# Patient Record
Sex: FEMALE | Race: WHITE | Employment: OTHER | URBAN - METROPOLITAN AREA
[De-identification: names, ages, dates, MRNs, and addresses within clinical notes are randomized per-mention and may not be internally consistent; named-entity substitution may affect disease eponyms.]

---

## 2017-02-27 ENCOUNTER — HOSPITAL ENCOUNTER (OUTPATIENT)
Dept: ULTRASOUND IMAGING | Age: 72
Discharge: HOME | End: 2017-02-27
Attending: INTERNAL MEDICINE
Payer: MEDICARE

## 2017-02-27 DIAGNOSIS — R93.2 ABNORMAL FINDINGS ON DIAGNOSTIC IMAGING OF LIVER: ICD-10-CM

## 2017-02-27 PROCEDURE — 76705 ECHO EXAM OF ABDOMEN: CPT

## 2017-04-17 ENCOUNTER — HOSPITAL ENCOUNTER (OUTPATIENT)
Dept: NUCLEAR MEDICINE | Age: 72
Discharge: HOME | End: 2017-04-17
Attending: INTERNAL MEDICINE
Payer: MEDICARE

## 2017-04-17 VITALS — WEIGHT: 134 LBS | BODY MASS INDEX: 20.37 KG/M2

## 2017-04-17 DIAGNOSIS — K80.20 CALCULUS OF GALLBLADDER WITHOUT CHOLECYSTITIS WITHOUT OBSTRUCTION: ICD-10-CM

## 2017-04-17 PROCEDURE — 74011250636 HC RX REV CODE- 250/636: Performed by: INTERNAL MEDICINE

## 2017-04-17 PROCEDURE — 78227 HEPATOBIL SYST IMAGE W/DRUG: CPT

## 2017-04-17 PROCEDURE — 74011000258 HC RX REV CODE- 258: Performed by: INTERNAL MEDICINE

## 2017-04-17 RX ORDER — SODIUM CHLORIDE 0.9 % (FLUSH) 0.9 %
10 SYRINGE (ML) INJECTION
Status: COMPLETED | OUTPATIENT
Start: 2017-04-17 | End: 2017-04-17

## 2017-04-17 RX ORDER — SODIUM CHLORIDE 9 MG/ML
25 INJECTION, SOLUTION INTRAVENOUS
Status: COMPLETED | OUTPATIENT
Start: 2017-04-17 | End: 2017-04-17

## 2017-04-17 RX ADMIN — SINCALIDE 1.22 MCG: 5 INJECTION, POWDER, LYOPHILIZED, FOR SOLUTION INTRAVENOUS at 12:34

## 2017-04-17 RX ADMIN — Medication 10 ML: at 12:00

## 2017-04-17 RX ADMIN — SODIUM CHLORIDE 25 ML: 900 INJECTION, SOLUTION INTRAVENOUS at 12:00

## 2017-08-04 ENCOUNTER — HOSPITAL ENCOUNTER (OUTPATIENT)
Dept: ULTRASOUND IMAGING | Age: 72
Discharge: HOME | End: 2017-08-04
Attending: INTERNAL MEDICINE
Payer: MEDICARE

## 2017-08-04 DIAGNOSIS — R10.2 PELVIC AND PERINEAL PAIN: ICD-10-CM

## 2017-08-04 PROCEDURE — 76830 TRANSVAGINAL US NON-OB: CPT

## 2017-08-04 PROCEDURE — 76856 US EXAM PELVIC COMPLETE: CPT

## 2020-07-17 ENCOUNTER — HOSPITAL ENCOUNTER (OUTPATIENT)
Dept: GENERAL RADIOLOGY | Age: 75
Discharge: HOME | End: 2020-07-17
Attending: INTERNAL MEDICINE
Payer: MEDICARE

## 2020-07-17 DIAGNOSIS — M53.3 DISORDER OF SACRUM: ICD-10-CM

## 2020-07-17 PROCEDURE — 72220 X-RAY EXAM SACRUM TAILBONE: CPT

## 2021-12-13 ENCOUNTER — HOSPITAL ENCOUNTER (OUTPATIENT)
Dept: ULTRASOUND IMAGING | Age: 76
Discharge: HOME | End: 2021-12-13
Attending: INTERNAL MEDICINE
Payer: MEDICARE

## 2021-12-13 ENCOUNTER — TRANSCRIBE ORDER (OUTPATIENT)
Dept: SCHEDULING | Age: 76
End: 2021-12-13

## 2021-12-13 DIAGNOSIS — R22.41 LOCALIZED SWELLING, MASS AND LUMP, RIGHT LOWER LIMB: Primary | ICD-10-CM

## 2021-12-13 DIAGNOSIS — R22.41 LOCALIZED SWELLING, MASS AND LUMP, RIGHT LOWER LIMB: ICD-10-CM

## 2021-12-13 PROCEDURE — 93971 EXTREMITY STUDY: CPT

## 2021-12-15 ENCOUNTER — OFFICE VISIT (OUTPATIENT)
Dept: ORTHOPEDIC SURGERY | Age: 76
End: 2021-12-15
Payer: MEDICARE

## 2021-12-15 VITALS — WEIGHT: 132 LBS | BODY MASS INDEX: 21.21 KG/M2 | HEIGHT: 66 IN

## 2021-12-15 DIAGNOSIS — M25.569 KNEE PAIN, UNSPECIFIED CHRONICITY, UNSPECIFIED LATERALITY: Primary | ICD-10-CM

## 2021-12-15 DIAGNOSIS — M17.11 OSTEOARTHRITIS OF RIGHT KNEE, UNSPECIFIED OSTEOARTHRITIS TYPE: ICD-10-CM

## 2021-12-15 PROCEDURE — 1101F PT FALLS ASSESS-DOCD LE1/YR: CPT | Performed by: ORTHOPAEDIC SURGERY

## 2021-12-15 PROCEDURE — G8420 CALC BMI NORM PARAMETERS: HCPCS | Performed by: ORTHOPAEDIC SURGERY

## 2021-12-15 PROCEDURE — G8400 PT W/DXA NO RESULTS DOC: HCPCS | Performed by: ORTHOPAEDIC SURGERY

## 2021-12-15 PROCEDURE — G8432 DEP SCR NOT DOC, RNG: HCPCS | Performed by: ORTHOPAEDIC SURGERY

## 2021-12-15 PROCEDURE — 20610 DRAIN/INJ JOINT/BURSA W/O US: CPT | Performed by: ORTHOPAEDIC SURGERY

## 2021-12-15 PROCEDURE — 99204 OFFICE O/P NEW MOD 45 MIN: CPT | Performed by: ORTHOPAEDIC SURGERY

## 2021-12-15 PROCEDURE — G8536 NO DOC ELDER MAL SCRN: HCPCS | Performed by: ORTHOPAEDIC SURGERY

## 2021-12-15 PROCEDURE — G8427 DOCREV CUR MEDS BY ELIG CLIN: HCPCS | Performed by: ORTHOPAEDIC SURGERY

## 2021-12-15 PROCEDURE — 3017F COLORECTAL CA SCREEN DOC REV: CPT | Performed by: ORTHOPAEDIC SURGERY

## 2021-12-15 PROCEDURE — 1090F PRES/ABSN URINE INCON ASSESS: CPT | Performed by: ORTHOPAEDIC SURGERY

## 2021-12-15 RX ORDER — ALBUTEROL SULFATE 90 UG/1
AEROSOL, METERED RESPIRATORY (INHALATION)
COMMUNITY
Start: 2021-12-13

## 2021-12-15 RX ORDER — LEVOTHYROXINE SODIUM 88 UG/1
TABLET ORAL
COMMUNITY
Start: 2021-11-08

## 2021-12-15 RX ORDER — FLUOXETINE 10 MG/1
10 CAPSULE ORAL DAILY
COMMUNITY
Start: 2021-10-01

## 2021-12-15 RX ORDER — MIRABEGRON 25 MG/1
TABLET, FILM COATED, EXTENDED RELEASE ORAL
COMMUNITY
Start: 2021-10-27

## 2021-12-15 RX ORDER — LIDOCAINE HYDROCHLORIDE 10 MG/ML
1 INJECTION INFILTRATION; PERINEURAL ONCE
Status: COMPLETED | OUTPATIENT
Start: 2021-12-15 | End: 2021-12-15

## 2021-12-15 RX ORDER — LEVOFLOXACIN 500 MG/1
TABLET, FILM COATED ORAL
COMMUNITY
Start: 2021-12-08

## 2021-12-15 RX ORDER — METHYLPREDNISOLONE ACETATE 40 MG/ML
80 INJECTION, SUSPENSION INTRA-ARTICULAR; INTRALESIONAL; INTRAMUSCULAR; SOFT TISSUE ONCE
Status: COMPLETED | OUTPATIENT
Start: 2021-12-15 | End: 2021-12-15

## 2021-12-15 RX ORDER — BUPIVACAINE HYDROCHLORIDE 2.5 MG/ML
1 INJECTION, SOLUTION INFILTRATION; PERINEURAL ONCE
Status: COMPLETED | OUTPATIENT
Start: 2021-12-15 | End: 2021-12-15

## 2021-12-15 RX ORDER — ESTRADIOL 10 UG/1
INSERT VAGINAL
COMMUNITY

## 2021-12-15 RX ADMIN — LIDOCAINE HYDROCHLORIDE 1 ML: 10 INJECTION INFILTRATION; PERINEURAL at 09:48

## 2021-12-15 RX ADMIN — BUPIVACAINE HYDROCHLORIDE 2.5 MG: 2.5 INJECTION, SOLUTION INFILTRATION; PERINEURAL at 09:47

## 2021-12-15 RX ADMIN — METHYLPREDNISOLONE ACETATE 80 MG: 40 INJECTION, SUSPENSION INTRA-ARTICULAR; INTRALESIONAL; INTRAMUSCULAR; SOFT TISSUE at 09:49

## 2021-12-15 NOTE — PROGRESS NOTES
ASSESSMENT/PLAN:  Below is the assessment and plan developed based on review of pertinent history, physical exam, labs, studies, and medications. 1. Knee pain, unspecified chronicity, unspecified laterality  -     XR KNEE RT MIN 4 V; Future  2. Osteoarthritis of right knee, unspecified osteoarthritis type  -     SC DRAIN/INJECT LARGE JOINT/BURSA  -     REFERRAL TO ORTHOPEDICS      Return for Referral to knee replacement specilaist.    In discussion with the patient, we considered the numerus possible diagnoses that could be contributing to their present symptoms. We also deliberated on the extensive management options that must be considered to treat their current condition. We reviewed their accessible prior medical records, diagnostic tests, and current health and employment information. We considered how these symptoms were affecting the patient´s activities of daily living as well as employment and fitness activities. The patient had various questions regarding the possible risks, benefits, complications, morbidity and mortality regarding their diagnosis and treatment options. The patients´ comorbidities were considered, and I advocated that they consider maximizing lifestyle modification through nutrition and exercise to aid in addressing their symptoms. Shared decision making yielded an understanding to move forward with conservation treatment preferences. The patient expressed understanding that if conservative management fails to alleviate the present symptoms they will return to office for re-evaluation and consideration of additional diagnostic tests and potential surgical options.      In the interim, we have recommned ice, elevation and anti-inflammatory medications along with a physician directed home exercise program. We discussed the risks and common side effects of anti-inflamatory medications and instructed the patient to discontinue the medication and contact us if they experienced any side effects. The patient was encouraged to discuss the possible side effects with their family physician or pharmacist prior to initiating any new medications. We discussed the possibility of an injection of a steroid mixed with a local anesthetic to relieve pain and decrease inflammation in the right knee. The risks of a steroid injection which include but are not limited to cartilage damage, death of nearby bone, joint infection, nerve damage, temporary facial flushing, temporary steroid flare of pain and inflammation in the joint, temporary increase in blood sugar, tendon weakening or rupture, thinning of nearby bone (osteoporosis), thinning of skin and soft tissue around the injection site, and whitening or lightening of the skin around the injection site were reviewed at length. We specifically advised that patients with diabetes talk to their primary care to ensure they are safe for a steroid injection due to the transient increase in blood sugar associated with the injection. We discussed the chance of increased bleeding and bruising if the patient is on blood thinners or certain dietary supplements that have a blood-thinning effect. We advised patients that have an active infection, history of allergic reactions to steroids or take medications that may prohibit them from receiving a steroid injection to talk to their primary care physician before receiving and injection. We also talked about limiting the number of injections because these potential side effects increase with larger doses and repeated use. After explaining the risks and benefits of the procedure and obtaining verbal informed consent from the patient, the proposed area for injection was confirmed with the patient. After all questions and concerns were addressed, the skin was prepped with alcohol to reduce the chances of infection.  The skin was anesthetized with a topical ethylene chloride spray and the mixture of two milliliters of Depo-Medrol (40mg/ml), one milliliter of Lidocaine and one milliliter of Marcaine was injected slowly into the intra-articular space of right knee in a sterile fashion without difficulty. The needle was removed and disposed of in a sterile container. The patient tolerated the injection well and a band-aid was placed on the skin. The patient was counseled on protecting the injection area, avoiding water submersion for 2 days, icing for pain relief and looking for signs and symptoms of infection. We requested that the patient contact us if any symptoms persist greater than 48 hours after the injection. After a long discussion regarding treatment options, we have elected to refer the patient to one of our knee replacement specialist for more comprehensive care of their arthritis. SUBJECTIVE/OBJECTIVE:  Harry Lindsey (: 1945) is a 78 y.o. female, patient,here for evaluation of the Knee Pain (right knee)  . The patient underwent went arthroscopy of the right knee in . She has known osteoarthritis. She presents today for follow-up. Physical Exam    Upon physical examination, the patient is well developed, well nourished, alert and oriented times three, with normal mood and affect and walks with an antalgic gait. Upon examination of the right knee, the patient is tender to palpation along the medial joint line, and has an effusion. They have crepitus of the patellofemoral joint with range of motion and discomfort with patella grind testing. The patient has discomfort with Rick´s maneuvers, and the knee is stable. They lack full flexion secondary to the effusion, but have full extension. They have 5/5 strength, and are neurovascularly intact distally. There is no erythema, warmth or skin lesions present. On examination of the contralateral extremity, the patient is nontender to palpation and has excellent range of motion, stability and strength.     Imaging:    Views of both knees demonstrate severe medial and patellofemoral compartment arthritis. No Known Allergies    Current Outpatient Medications   Medication Sig    albuterol (PROVENTIL HFA, VENTOLIN HFA, PROAIR HFA) 90 mcg/actuation inhaler     estradioL (Vagifem) 10 mcg tab vaginal tablet Vagifem 10 mcg vaginal tablet   1 tablet per vagina nightly for 2 weeks then twice a week    levoFLOXacin (LEVAQUIN) 500 mg tablet     Myrbetriq 25 mg ER tablet     FLUoxetine (PROzac) 10 mg capsule Take 10 mg by mouth daily.  levothyroxine (SYNTHROID) 88 mcg tablet     MAGNESIUM CHLORIDE PO magnesium    collagen, bovine, 100 % powd collagen (bovine)    biotin/calcium carbonate (BIOTIN 100+10 PO) biotin     Current Facility-Administered Medications   Medication    bupivacaine HCl (MARCAINE) 0.25 % (2.5 mg/mL) injection 2.5 mg    lidocaine (XYLOCAINE) 10 mg/mL (1 %) injection 1 mL    methylPREDNISolone acetate (DEPO-MEDROL) 40 mg/mL injection 80 mg       Past Medical History:   Diagnosis Date    Arthritis     Cancer [facility])        Past Surgical History:   Procedure Laterality Date    HX KNEE ARTHROSCOPY      HX ORTHOPAEDIC      HX WRIST FRACTURE [place]         History reviewed. No pertinent family history. Social History     Socioeconomic History    Marital status:      Spouse name: Not on file    Number of children: Not on file    Years of education: Not on file    Highest education level: Not on file   Occupational History    Not on file   Tobacco Use    Smoking status: Never Smoker    Smokeless tobacco: Never Used   Vaping Use    Vaping Use: Never used   Substance and Sexual Activity    Alcohol use:  Yes    Drug use: Never    Sexual activity: Not on file   Other Topics Concern    Not on file   Social History Narrative    Not on file     Social Determinants of Health     Financial Resource Strain:     Difficulty of Paying Living Expenses: Not on file   Food Insecurity:     Worried About [facility] in the Last Year: Not on file    Ran Out of Food in the Last Year: Not on file   Transportation Needs:     Lack of Transportation (Medical): Not on file    Lack of Transportation (Non-Medical): Not on file   Physical Activity:     Days of Exercise per Week: Not on file    Minutes of Exercise per Session: Not on file   Stress:     Feeling of Stress : Not on file   Social Connections:     Frequency of Communication with Friends and Family: Not on file    Frequency of Social Gatherings with Friends and Family: Not on file    Attends Islam Services: Not on file    Active Member of [address] [place] [address] Online-OR or Organizations: Not on file    Attends Club or Organization Meetings: Not on file    Marital Status: Not on file   Intimate Partner Violence:     Fear of Current or Ex-Partner: Not on file    Emotionally Abused: Not on file    Physically Abused: Not on file    Sexually Abused: Not on file   Housing Stability:     Unable to Pay for Housing in the Last Year: Not on file    Number of [place] in the Last Year: Not on file    Unstable Housing in the Last Year: Not on file       Review of Systems    No flowsheet data found. Vitals:  Ht 5' 6\" (1.676 m)   Wt 132 lb (59.9 kg)   BMI 21.31 kg/m²    Body mass index is 21.31 kg/m². An electronic signature was used to authenticate this note.   -- So Menjivar MD

## 2022-01-18 NOTE — PROGRESS NOTES
HPI: Clari Tabares (: [id number]) is a 78 y.o. female, patient, here for evaluation of the following chief complaint(s):    Thumb Pain (Patient stated she would like  CSI right thumb) and Follow-up  Patient is seen today to evaluate her hands. 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 She has had a prior right ring trigger finger release by another physician on 2020. She has known right small finger stenosing tenosynovitis and did receive a corticosteroid injection on 2021. She also has thumb carpometacarpal joint osteoarthritis. She received a corticosteroid injection for right thumb CMC arthritis in 2021. She is still complaining of a component of basal joint arthritic pain. She is not quite ready to consider surgery and return for an injection. Vitals:  Ht 5' 6\" (1.676 m)   Wt 132 lb (59.9 kg)   BMI 21.31 kg/m²    Body mass index is 21.31 kg/m². No Known Allergies    Current Outpatient Medications   Medication Sig    albuterol (PROVENTIL HFA, VENTOLIN HFA, PROAIR HFA) 90 mcg/actuation inhaler     estradioL (Vagifem) 10 mcg tab vaginal tablet Vagifem 10 mcg vaginal tablet   1 tablet per vagina nightly for 2 weeks then twice a week    levoFLOXacin (LEVAQUIN) 500 mg tablet     Myrbetriq 25 mg ER tablet     FLUoxetine (PROzac) 10 mg capsule Take 10 mg by mouth daily.     levothyroxine (SYNTHROID) 88 mcg tablet     MAGNESIUM CHLORIDE PO magnesium    collagen, bovine, 100 % powd collagen (bovine)    biotin/calcium carbonate (BIOTIN 100+10 PO) biotin     Current Facility-Administered Medications   Medication    lidocaine (XYLOCAINE) 10 mg/mL (1 %) injection 1 mL    triamcinolone acetonide (KENALOG-40) 40 mg/mL injection 40 mg       Past Medical History:   Diagnosis Date    Arthritis     Cancer [facility])         Past Surgical History:   Procedure Laterality Date    HX KNEE ARTHROSCOPY      HX ORTHOPAEDIC      HX WRIST FRACTURE [place]         History reviewed. No pertinent family history. Social History     Tobacco Use    Smoking status: Never Smoker    Smokeless tobacco: Never Used   Vaping Use    Vaping Use: Never used   Substance Use Topics    Alcohol use: Yes    Drug use: Never        Review of Systems   All other systems reviewed and are negative. ROS     Negative for: Constitutional, Gastrointestinal, Neurological, Skin, Genitourinary, Musculoskeletal, HENT, Endocrine, Cardiovascular, Eyes, Respiratory, Psychiatric, Allergic/Imm, Heme/Lymph    Last edited by Aide Capellan on 1/19/2022 10:22 AM. (History)             Physical Exam    Patient's right thumb has dorsal prominence pain and crepitation with grind test of the basal joint. She still has slight triggering involving the right small finger as well but less pain in general.  Similar but milder problem of basal joint arthritis on the left side. Imaging:    No new x-rays today      ASSESSMENT/PLAN:  Below is the assessment and plan developed based on review of pertinent history, physical exam, labs, studies, and medications. The patient's examination is remain consistent with right thumb carpometacarpal joint osteoarthritis as well as right small finger mild stenosing tenosynovitis. She tolerated a right thumb CMC injection well on 1/19/2022. She is giving strong consideration for the future to undergo a right thumb CMC arthroplasty. I will see her back in 3 to 4 weeks to check her progress or as needed. 1. Bilateral hand pain  -     DRAIN/INJECT SMALL JOINT/BURSA  -     lidocaine (XYLOCAINE) 10 mg/mL (1 %) injection 1 mL; 1 mL, IntraDERMal, ONCE, 1 dose, On Wed 1/19/22 at 1100  -     triamcinolone acetonide (KENALOG-40) 40 mg/mL injection 40 mg; 40 mg, Intra artICUlar, ONCE, 1 dose, On Wed 1/19/22 at 1100  2.  Primary osteoarthritis of both first carpometacarpal joints  -     DRAIN/INJECT SMALL JOINT/BURSA  - lidocaine (XYLOCAINE) 10 mg/mL (1 %) injection 1 mL; 1 mL, IntraDERMal, ONCE, 1 dose, On Wed 1/19/22 at 1100  -     triamcinolone acetonide (KENALOG-40) 40 mg/mL injection 40 mg; 40 mg, Intra artICUlar, ONCE, 1 dose, On Wed 1/19/22 at 1100    Informed consent was obtained and the patient received a right thumb carpometacarpal joint corticosteroid injection with 40 mg of triamcinolone mixed with 1 cc 1% lidocaine. She tolerated the injection well. Return if symptoms worsen or fail to improve. An electronic signature was used to authenticate this note.   -- Hans Valle MD

## 2022-01-19 ENCOUNTER — OFFICE VISIT (OUTPATIENT)
Dept: ORTHOPEDIC SURGERY | Age: 77
End: 2022-01-19
Payer: MEDICARE

## 2022-01-19 VITALS — HEIGHT: 66 IN | WEIGHT: 132 LBS | BODY MASS INDEX: 21.21 KG/M2

## 2022-01-19 DIAGNOSIS — M18.0 PRIMARY OSTEOARTHRITIS OF BOTH FIRST CARPOMETACARPAL JOINTS: ICD-10-CM

## 2022-01-19 DIAGNOSIS — M79.641 BILATERAL HAND PAIN: Primary | ICD-10-CM

## 2022-01-19 DIAGNOSIS — M79.642 BILATERAL HAND PAIN: Primary | ICD-10-CM

## 2022-01-19 PROCEDURE — G8427 DOCREV CUR MEDS BY ELIG CLIN: HCPCS | Performed by: ORTHOPAEDIC SURGERY

## 2022-01-19 PROCEDURE — G8510 SCR DEP NEG, NO PLAN REQD: HCPCS | Performed by: ORTHOPAEDIC SURGERY

## 2022-01-19 PROCEDURE — 3017F COLORECTAL CA SCREEN DOC REV: CPT | Performed by: ORTHOPAEDIC SURGERY

## 2022-01-19 PROCEDURE — G8400 PT W/DXA NO RESULTS DOC: HCPCS | Performed by: ORTHOPAEDIC SURGERY

## 2022-01-19 PROCEDURE — 1101F PT FALLS ASSESS-DOCD LE1/YR: CPT | Performed by: ORTHOPAEDIC SURGERY

## 2022-01-19 PROCEDURE — 20600 DRAIN/INJ JOINT/BURSA W/O US: CPT | Performed by: ORTHOPAEDIC SURGERY

## 2022-01-19 PROCEDURE — G8536 NO DOC ELDER MAL SCRN: HCPCS | Performed by: ORTHOPAEDIC SURGERY

## 2022-01-19 PROCEDURE — 99213 OFFICE O/P EST LOW 20 MIN: CPT | Performed by: ORTHOPAEDIC SURGERY

## 2022-01-19 PROCEDURE — G8420 CALC BMI NORM PARAMETERS: HCPCS | Performed by: ORTHOPAEDIC SURGERY

## 2022-01-19 PROCEDURE — 1090F PRES/ABSN URINE INCON ASSESS: CPT | Performed by: ORTHOPAEDIC SURGERY

## 2022-01-19 RX ORDER — TRIAMCINOLONE ACETONIDE 40 MG/ML
40 INJECTION, SUSPENSION INTRA-ARTICULAR; INTRAMUSCULAR ONCE
Status: COMPLETED | OUTPATIENT
Start: 2022-01-19 | End: 2022-01-19

## 2022-01-19 RX ORDER — LIDOCAINE HYDROCHLORIDE 10 MG/ML
1 INJECTION INFILTRATION; PERINEURAL ONCE
Status: COMPLETED | OUTPATIENT
Start: 2022-01-19 | End: 2022-01-19

## 2022-01-19 RX ADMIN — TRIAMCINOLONE ACETONIDE 40 MG: 40 INJECTION, SUSPENSION INTRA-ARTICULAR; INTRAMUSCULAR at 10:58

## 2022-01-19 RX ADMIN — LIDOCAINE HYDROCHLORIDE 1 ML: 10 INJECTION INFILTRATION; PERINEURAL at 10:57

## 2022-01-19 NOTE — PATIENT INSTRUCTIONS
Learning About Arthritis at the [facility] of the Thumb  What is it? Arthritis at the base of the thumb joint is wear and tear on the cartilage. Cartilage is a firm, thick, slippery tissue. It covers and protects the ends of bones where they meet to form a joint. When you have arthritis, there are changes in the cartilage that cause it to break down. The bones rub together and cause joint damage and pain. What causes it? Experts don't know what causes arthritis at the base of the thumb. But aging, a lot of use, an injury, or family history may play a part. What are the symptoms? Symptoms of arthritis at the base of the thumb include aching in your joint. Or the pain may feel burning or sharp. You may feel clicking, creaking, or catching in the joint. It may get stiff. You may have more pain and less strength when you pinch or  things. Symptoms may come and go, stay the same, or get worse over time. How is it diagnosed? Your doctor can often diagnose arthritis by asking you questions about your joint pain and other symptoms and examining you. You may also have X-rays and blood tests. Blood tests can help make sure another disease isn't causing your symptoms. How is it treated? Arthritis at the base of your thumb may be treated with rest, pain relievers, steroid medicines, using a brace or splint, and--in some cases--surgery. To help relieve pain in the joint, rest your sore hand. Switch hands for some activities. You can try heat and cold therapy, such as hot compresses, paraffin wax, cold packs, or ice massage. Your doctor may give you a splint to wear during some activities or when pain flares up. You can often manage mild or moderate arthritis pain with over-the-counter pain relievers. These include medicines that reduce swelling, such as ibuprofen or naproxen. You can also use acetaminophen. Sometimes these medicines are in creams that you can rub on your thumb and hand.  Your doctor may also prescribe other medicine for your pain. For some people, steroid shots may be an option. If none of the treatments work, your doctor may discuss surgery with you. Follow-up care is a key part of your treatment and safety. Be sure to make and go to all appointments, and call your doctor if you are having problems. It's also a good idea to know your test results and keep a list of the medicines you take. Where can you learn more? Go to http://www.gray.com/  Enter T110 in the search box to learn more about \"Learning About Arthritis at the [facility] of the Thumb. \"  Current as of: April 30, 2021               Content Version: 13.0  © 9476-5254 [employer], [employer]. Care instructions adapted under license by [employer] (which disclaims liability or warranty for this information). If you have questions about a medical condition or this instruction, always ask your healthcare professional. [address] any warranty or liability for your use of this information.

## 2022-01-19 NOTE — PROGRESS NOTES
Identified pt with two pt identifiers(name and ). Reviewed record in preparation for visit and have obtained necessary documentation. All patient medications has been reviewed. Chief Complaint   Patient presents with    Thumb Pain     Patient stated she would like  CSI right thumb    Follow-up       3 most recent PHQ Screens 2022   Little interest or pleasure in doing things Not at all   Feeling down, depressed, irritable, or hopeless Not at all   Total Score PHQ 2 0     Abuse Screening Questionnaire 2022   Do you ever feel afraid of your partner? N   Are you in a relationship with someone who physically or mentally threatens you? N   Is it safe for you to go home? Y       Health Maintenance Due   Topic    Hepatitis C Screening     COVID-19 Vaccine (1)    DTaP/Tdap/Td series (1 - Tdap)    Lipid Screen     Colorectal Cancer Screening Combo     Shingrix Vaccine Age 50> (1 of 2)    Breast Cancer Screen Mammogram     Bone Densitometry (Dexa) Screening     Pneumococcal 65+ years (1 of 1 - PPSV23)    Medicare Yearly Exam     Flu Vaccine (1)     Health Maintenance Review: Patient reminded of \"due or due soon\" health maintenance. I have asked the patient to contact his/her primary care provider (PCP) for follow-up on his/her health maintenance. Vitals:    22 1021   Weight: 132 lb (59.9 kg)   Height: 5' 6\" (1.676 m)   PainSc:   5   PainLoc: Hand       Wt Readings from Last 3 Encounters:   22 132 lb (59.9 kg)   12/15/21 132 lb (59.9 kg)   17 134 lb (60.8 kg)     Temp Readings from Last 3 Encounters:   13 98.4 °F (36.9 °C)     BP Readings from Last 3 Encounters:   13 120/64     Pulse Readings from Last 3 Encounters:   13 76       Coordination of Care Questionnaire:   1) Have you been to an emergency room, urgent care, or hospitalized since your last visit? No     2. Have seen or consulted any other health care provider since your last visit?  No

## 2022-06-14 NOTE — PROGRESS NOTES
HPI: Yvan Anton (: [birth year]) is a 78 y.o. female, patient, here for evaluation of the following chief complaint(s):    No chief complaint on file. Patient is seen today to evaluate her hands. 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 She has had a prior right ring trigger finger release by another physician on 2020. She has known right small finger stenosing tenosynovitis and did receive a corticosteroid injection on 2021. She also has thumb carpometacarpal joint osteoarthritis. She received a corticosteroid injection for right thumb CMC arthritis in 2021. She is still complaining of a component of basal joint arthritic pain. She is not quite ready to consider surgery and instead received a right thumb CMC injection on 2022. Vitals: There were no vitals taken for this visit. There is no height or weight on file to calculate BMI. No Known Allergies    Current Outpatient Medications   Medication Sig    albuterol (PROVENTIL HFA, VENTOLIN HFA, PROAIR HFA) 90 mcg/actuation inhaler     estradioL (Vagifem) 10 mcg tab vaginal tablet Vagifem 10 mcg vaginal tablet   1 tablet per vagina nightly for 2 weeks then twice a week    levoFLOXacin (LEVAQUIN) 500 mg tablet     Myrbetriq 25 mg ER tablet     FLUoxetine (PROzac) 10 mg capsule Take 10 mg by mouth daily.  levothyroxine (SYNTHROID) 88 mcg tablet     MAGNESIUM CHLORIDE PO magnesium    collagen, bovine, 100 % powd collagen (bovine)    biotin/calcium carbonate (BIOTIN 100+10 PO) biotin     No current facility-administered medications for this visit. Past Medical History:   Diagnosis Date    Arthritis     Cancer [facility])         Past Surgical History:   Procedure Laterality Date    HX KNEE ARTHROSCOPY      HX ORTHOPAEDIC      HX WRIST FRACTURE [place]         No family history on file.      Social History     Tobacco Use    Smoking status: Never Smoker    Smokeless tobacco: Never Used   Vaping Use    Vaping Use: Never used   Substance Use Topics    Alcohol use: Yes    Drug use: Never        Review of Systems   All other systems reviewed and are negative. Physical Exam    Patient's right thumb has dorsal prominence pain and crepitation with grind test of the basal joint. Her prior triggering in the small finger has resolved. She is already had a prior ring trigger finger release. No locking at all in the right hand. Left thumb has some basal joint pain but much milder when compared to the right side. Imaging:    No new x-rays today      ASSESSMENT/PLAN:  Below is the assessment and plan developed based on review of pertinent history, physical exam, labs, studies, and medications. The patient's examination is remain consistent with right thumb carpometacarpal joint osteoarthritis as well as right small finger mild stenosing tenosynovitis. She tolerated a right thumb CMC injection well on 1/19/2022. She is giving strong consideration for the future to undergo a right thumb CMC arthroplasty. I reviewed treatment options and answered her questions. She states that she is also had a second opinion with another hand surgeon and she would now like to more strongly consider surgical treatment of the right thumb. I reviewed the treatment plan that would include a thumb CMC arthroplasty with trapezium resection, FCR interpositional tendon transfer and suspensioplasty. I reviewed risks that include but not limited to stiffness, pain, nerve or tendon damage and overall incomplete relief of pain. Arrangements can made for this to be performed on an outpatient basis at her convenience. 1. Primary osteoarthritis of both first carpometacarpal joints  2. Bilateral hand pain          No follow-ups on file. An electronic signature was used to authenticate this note.   -- Lee Matthews MD

## 2022-06-15 ENCOUNTER — OFFICE VISIT (OUTPATIENT)
Dept: ORTHOPEDIC SURGERY | Age: 77
End: 2022-06-15
Payer: MEDICARE

## 2022-06-15 DIAGNOSIS — M18.0 PRIMARY OSTEOARTHRITIS OF BOTH FIRST CARPOMETACARPAL JOINTS: Primary | ICD-10-CM

## 2022-06-15 DIAGNOSIS — M79.642 BILATERAL HAND PAIN: ICD-10-CM

## 2022-06-15 DIAGNOSIS — M79.641 BILATERAL HAND PAIN: ICD-10-CM

## 2022-06-15 PROCEDURE — G8420 CALC BMI NORM PARAMETERS: HCPCS | Performed by: ORTHOPAEDIC SURGERY

## 2022-06-15 PROCEDURE — G8536 NO DOC ELDER MAL SCRN: HCPCS | Performed by: ORTHOPAEDIC SURGERY

## 2022-06-15 PROCEDURE — 3017F COLORECTAL CA SCREEN DOC REV: CPT | Performed by: ORTHOPAEDIC SURGERY

## 2022-06-15 PROCEDURE — 99213 OFFICE O/P EST LOW 20 MIN: CPT | Performed by: ORTHOPAEDIC SURGERY

## 2022-06-15 PROCEDURE — G8400 PT W/DXA NO RESULTS DOC: HCPCS | Performed by: ORTHOPAEDIC SURGERY

## 2022-06-15 PROCEDURE — G8427 DOCREV CUR MEDS BY ELIG CLIN: HCPCS | Performed by: ORTHOPAEDIC SURGERY

## 2022-06-15 PROCEDURE — 1090F PRES/ABSN URINE INCON ASSESS: CPT | Performed by: ORTHOPAEDIC SURGERY

## 2022-06-15 PROCEDURE — G8432 DEP SCR NOT DOC, RNG: HCPCS | Performed by: ORTHOPAEDIC SURGERY

## 2022-06-15 PROCEDURE — 1123F ACP DISCUSS/DSCN MKR DOCD: CPT | Performed by: ORTHOPAEDIC SURGERY

## 2022-06-15 PROCEDURE — 1101F PT FALLS ASSESS-DOCD LE1/YR: CPT | Performed by: ORTHOPAEDIC SURGERY

## 2022-06-15 NOTE — PATIENT INSTRUCTIONS
Learning About Arthritis at the [facility] of the Thumb  What is it? Arthritis at the base of the thumb joint is wear and tear on the cartilage. Cartilage is a firm, thick, slippery tissue. It covers and protects the ends of bones where they meet to form a joint. When you have arthritis, there are changes in the cartilage that cause it to break down. The bones rub together and cause joint damage and pain. What causes it? Experts don't know what causes arthritis at the base of the thumb. But aging, a lot of use, an injury, or family history may play a part. What are the symptoms? Symptoms of arthritis at the base of the thumb include aching in your joint. Or the pain may feel burning or sharp. You may feel clicking, creaking, or catching in the joint. It may get stiff. You may have more pain and less strength when you pinch or  things. Symptoms may come and go, stay the same, or get worse over time. How is it diagnosed? Your doctor can often diagnose arthritis by asking you questions about your joint pain and other symptoms and examining you. You may also have X-rays and blood tests. Blood tests can help make sure another disease isn't causing your symptoms. How is it treated? Arthritis at the base of your thumb may be treated with rest, pain relievers, steroid medicines, using a brace or splint, and--in some cases--surgery. To help relieve pain in the joint, rest your sore hand. Switch hands for some activities. You can try heat and cold therapy, such as hot compresses, paraffin wax, cold packs, or ice massage. Your doctor may give you a splint to wear during some activities or when pain flares up. You can often manage mild or moderate arthritis pain with over-the-counter pain relievers. These include medicines that reduce swelling, such as ibuprofen or naproxen. You can also use acetaminophen. Sometimes these medicines are in creams that you can rub on your thumb and hand.  Your doctor may also prescribe other medicine for your pain. For some people, steroid shots may be an option. If none of the treatments work, your doctor may discuss surgery with you. Follow-up care is a key part of your treatment and safety. Be sure to make and go to all appointments, and call your doctor if you are having problems. It's also a good idea to know your test results and keep a list of the medicines you take. Where can you learn more? Go to http://www.gray.com/  Enter T110 in the search box to learn more about \"Learning About Arthritis at the [facility] of the Thumb. \"  Current as of: December 20, 2021               Content Version: 13.2  © 2220-5510 Healthwise, Incorporated. Care instructions adapted under license by [employer] (which disclaims liability or warranty for this information). If you have questions about a medical condition or this instruction, always ask your healthcare professional. [address] any warranty or liability for your use of this information.

## 2022-06-15 NOTE — LETTER
6/15/2022    Patient: Moise Avendano   YOB: 1945   Date of Visit: 6/15/2022     Sam Sagastume, [address] [address]  Via Fax: [phone]    Dear Sam Sagastume MD,      Thank you for referring Ms. Jez Oconnor to [facility] for evaluation. My notes for this consultation are attached. If you have questions, please do not hesitate to call me. I look forward to following your patient along with you.       Sincerely,    Janett Wren MD

## 2022-11-07 ENCOUNTER — TRANSCRIBE ORDER (OUTPATIENT)
Dept: GENERAL RADIOLOGY | Age: 77
End: 2022-11-07

## 2022-11-07 ENCOUNTER — HOSPITAL ENCOUNTER (OUTPATIENT)
Dept: GENERAL RADIOLOGY | Age: 77
Discharge: HOME | End: 2022-11-07
Attending: INTERNAL MEDICINE
Payer: MEDICARE

## 2022-11-07 DIAGNOSIS — R05.9 COUGH: ICD-10-CM

## 2022-11-07 DIAGNOSIS — R05.9 COUGH: Primary | ICD-10-CM

## 2022-11-07 PROCEDURE — 71046 X-RAY EXAM CHEST 2 VIEWS: CPT

## 2022-12-07 NOTE — PROGRESS NOTES
HPI: Loren Ojeda (: [id number]) is a 78 y.o. female, patient, here for evaluation of the following chief complaint(s):    No chief complaint on file. Patient is seen today to evaluate her hands. 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 She has had a prior right ring trigger finger release by another physician on 2020. She has known right small finger stenosing tenosynovitis and did receive a corticosteroid injection on 2021. She also has thumb carpometacarpal joint osteoarthritis. She received a corticosteroid injection for right thumb CMC arthritis in 2021. She is still complaining of a component of basal joint arthritic pain. She is not quite ready to consider surgery and instead received a right thumb CMC injection on 2022. She had previously considered undergoing bilateral thumb CMC injections on 2022. She reconsider and return once again to schedule for a right thumb [facility] arthroplasty surgical procedure. Vitals: There were no vitals taken for this visit. There is no height or weight on file to calculate BMI. No Known Allergies    Current Outpatient Medications   Medication Sig    methylPREDNISolone (MEDROL DOSEPACK) 4 mg tablet Per dose pack instructions    albuterol (PROVENTIL HFA, VENTOLIN HFA, PROAIR HFA) 90 mcg/actuation inhaler     estradioL (Vagifem) 10 mcg tab vaginal tablet Vagifem 10 mcg vaginal tablet   1 tablet per vagina nightly for 2 weeks then twice a week    levoFLOXacin (LEVAQUIN) 500 mg tablet     Myrbetriq 25 mg ER tablet     FLUoxetine (PROzac) 10 mg capsule Take 10 mg by mouth daily. levothyroxine (SYNTHROID) 88 mcg tablet     MAGNESIUM CHLORIDE PO magnesium    collagen, bovine, 100 % powd collagen (bovine)    biotin/calcium carbonate (BIOTIN 100+10 PO) biotin     No current facility-administered medications for this visit.        Past Medical History:   Diagnosis Date    Arthritis     Cancer [facility])         Past Surgical History:   Procedure Laterality Date    HX KNEE ARTHROSCOPY      HX ORTHOPAEDIC      HX WRIST FRACTURE TX         History reviewed. No pertinent family history. Social History     Tobacco Use    Smoking status: Never    Smokeless tobacco: Never   Vaping Use    Vaping Use: Never used   Substance Use Topics    Alcohol use: Yes    Drug use: Never        Review of Systems   All other systems reviewed and are negative. Physical Exam    Patient's right thumb has dorsal prominence pain and crepitation with grind test of the basal joint. Her prior triggering in the small finger has resolved. She has already had a prior ring trigger finger release. No locking at all in the right hand. Left thumb has some basal joint pain but much milder when compared to the right side. Imaging:    XR Results (most recent):  Results from Appointment encounter on 12/08/22    XR HAND RT MIN 3 V    Narrative  AP, lateral and oblique x-ray of the right hand shows advanced thumb CMC and even some STT degenerative arthritic narrowing with significant subchondral sclerosis on the distal side of the trapezium. There is osteopenia seen throughout a narrowing of the DIP PIP joint spaces. ASSESSMENT/PLAN:  Below is the assessment and plan developed based on review of pertinent history, physical exam, labs, studies, and medications. The patient's examination is remain consistent with right thumb carpometacarpal joint osteoarthritis as well as right small finger mild stenosing tenosynovitis. She tolerated a right thumb CMC injection well on 1/19/2022. She had considered surgery earlier in the year but now is more convinced and did not want to repeat any injection. She is giving strong consideration for the future to undergo a right thumb CMC arthroplasty. I reviewed treatment options and answered her questions.   She states that she is also had a second opinion with another hand surgeon and she would now like to more strongly consider surgical treatment of the right thumb. I reviewed the treatment plan that would include a thumb CMC arthroplasty with trapezium resection, partial proximal trapezoid resection due to the STT component of her arthritis, FCR interpositional tendon transfer and suspensioplasty. I reviewed risks that include but not limited to stiffness, pain, nerve or tendon damage and overall incomplete relief of pain. Arrangements can made for this to be performed on an outpatient basis at her convenience. 1. Primary osteoarthritis of both first carpometacarpal joints  -     XR HAND RT MIN 3 V; Future  -     methylPREDNISolone (MEDROL DOSEPACK) 4 mg tablet; Per dose pack instructions, Normal, Disp-1 Dose Pack, R-0  2. Bilateral hand pain          Return for Follow-up 7 to 10 days after surgery. .    An electronic signature was used to authenticate this note.   -- Jose Alfredo Brooks MD

## 2022-12-08 ENCOUNTER — OFFICE VISIT (OUTPATIENT)
Dept: ORTHOPEDIC SURGERY | Age: 77
End: 2022-12-08
Payer: MEDICARE

## 2022-12-08 DIAGNOSIS — M18.0 PRIMARY OSTEOARTHRITIS OF BOTH FIRST CARPOMETACARPAL JOINTS: Primary | ICD-10-CM

## 2022-12-08 DIAGNOSIS — M79.642 BILATERAL HAND PAIN: ICD-10-CM

## 2022-12-08 DIAGNOSIS — M79.641 BILATERAL HAND PAIN: ICD-10-CM

## 2022-12-08 RX ORDER — METHYLPREDNISOLONE 4 MG/1
TABLET ORAL
Qty: 1 DOSE PACK | Refills: 0 | Status: SHIPPED | OUTPATIENT
Start: 2022-12-08

## 2022-12-08 NOTE — LETTER
12/8/2022    Patient: Cecily Thurman   YOB: 1945   Date of Visit: 12/8/2022     Tyshawn Fay MD  [address] [address]  Via Fax: [phone]    Dear Tyshawn Fay MD,      Thank you for referring Ms. Esa Brown to [facility] for evaluation. My notes for this consultation are attached. If you have questions, please do not hesitate to call me. I look forward to following your patient along with you.       Sincerely,    Ej Peterson MD

## 2022-12-08 NOTE — PATIENT INSTRUCTIONS
Learning About Arthritis at the [facility] of the Thumb  What is it? Arthritis at the base of the thumb joint is wear and tear on the cartilage. Cartilage is a firm, thick, slippery tissue. It covers and protects the ends of bones where they meet to form a joint. When you have arthritis, there are changes in the cartilage that cause it to break down. The bones rub together and cause joint damage and pain. What causes it? Experts don't know what causes arthritis at the base of the thumb. But aging, a lot of use, an injury, or family history may play a part. What are the symptoms? Symptoms of arthritis at the base of the thumb include aching in your joint. Or the pain may feel burning or sharp. You may feel clicking, creaking, or catching in the joint. It may get stiff. You may have more pain and less strength when you pinch or  things. Symptoms may come and go, stay the same, or get worse over time. How is it diagnosed? Your doctor can often diagnose arthritis by asking you questions about your joint pain and other symptoms and examining you. You may also have X-rays and blood tests. Blood tests can help make sure another disease isn't causing your symptoms. How is it treated? Arthritis at the base of your thumb may be treated with rest, pain relievers, steroid medicines, using a brace or splint, and--in some cases--surgery. To help relieve pain in the joint, rest your sore hand. Switch hands for some activities. You can try heat and cold therapy, such as hot compresses, paraffin wax, cold packs, or ice massage. Your doctor may give you a splint to wear during some activities or when pain flares up. You can often manage mild or moderate arthritis pain with over-the-counter pain relievers. These include medicines that reduce swelling, such as ibuprofen or naproxen. You can also use acetaminophen. Sometimes these medicines are in creams that you can rub on your thumb and hand.  Your doctor may also prescribe other medicine for your pain. For some people, steroid shots may be an option. If none of the treatments work, your doctor may discuss surgery with you. Follow-up care is a key part of your treatment and safety. Be sure to make and go to all appointments, and call your doctor if you are having problems. It's also a good idea to know your test results and keep a list of the medicines you take. Where can you learn more? Go to http://www.gray.com/  Enter T110 in the search box to learn more about \"Learning About Arthritis at the [facility] of the Thumb. \"  Current as of: March 9, 2022               Content Version: 13.4  © 0127-0891 Healthwise, Incorporated. Care instructions adapted under license by [employer] (which disclaims liability or warranty for this information). If you have questions about a medical condition or this instruction, always ask your healthcare professional. [address] any warranty or liability for your use of this information.

## 2023-01-12 ENCOUNTER — TELEPHONE (OUTPATIENT)
Dept: ORTHOPEDIC SURGERY | Age: 78
End: 2023-01-12

## 2023-01-12 DIAGNOSIS — M18.0 PRIMARY OSTEOARTHRITIS OF BOTH FIRST CARPOMETACARPAL JOINTS: Primary | ICD-10-CM

## 2023-01-12 RX ORDER — HYDROCODONE BITARTRATE AND ACETAMINOPHEN 5; 325 MG/1; MG/1
1 TABLET ORAL
Qty: 15 TABLET | Refills: 0 | Status: SHIPPED | OUTPATIENT
Start: 2023-01-12 | End: 2023-01-19

## 2023-01-19 NOTE — PROGRESS NOTES
HPI: Kenya Barcenas (: 1945) is a 78 y.o. female, patient, here for evaluation of the following chief complaint(s):    Surgical Follow-up (Right thumb [facility] [place] joint arthroplasty on 23.)  Patient is seen today to evaluate her hands. 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 She has had a prior right ring trigger finger release by another physician on 2020. She has known right small finger stenosing tenosynovitis and did receive a corticosteroid injection on 2021. She also has thumb carpometacarpal joint osteoarthritis. She received a corticosteroid injection for right thumb CMC arthritis in 2021. She is still complaining of a component of basal joint arthritic pain. She is not quite ready to consider surgery and instead received a right thumb CMC injection on 2022. She had previously considered undergoing bilateral thumb CMC injections on 2022. She underwent a right thumb carpometacarpal joint arthroplasty with trapezium and partial proximal trapezoid resection and FCR interpositional tendon transfer including suspensioplasty on 2023. Vitals: There were no vitals taken for this visit. There is no height or weight on file to calculate BMI. No Known Allergies    Current Outpatient Medications   Medication Sig    methylPREDNISolone (MEDROL DOSEPACK) 4 mg tablet Per dose pack instructions    albuterol (PROVENTIL HFA, VENTOLIN HFA, PROAIR HFA) 90 mcg/actuation inhaler     estradioL (Vagifem) 10 mcg tab vaginal tablet Vagifem 10 mcg vaginal tablet   1 tablet per vagina nightly for 2 weeks then twice a week    levoFLOXacin (LEVAQUIN) 500 mg tablet     Myrbetriq 25 mg ER tablet     FLUoxetine (PROzac) 10 mg capsule Take 10 mg by mouth daily.     levothyroxine (SYNTHROID) 88 mcg tablet     MAGNESIUM CHLORIDE PO magnesium    collagen, bovine, 100 % powd collagen (bovine)    biotin/calcium carbonate (BIOTIN 100+10 PO) biotin     No current facility-administered medications for this visit. Past Medical History:   Diagnosis Date    Arthritis     Cancer [facility])         Past Surgical History:   Procedure Laterality Date    HX KNEE ARTHROSCOPY      HX ORTHOPAEDIC      HX WRIST FRACTURE TX         History reviewed. No pertinent family history. Social History     Tobacco Use    Smoking status: Never    Smokeless tobacco: Never   Vaping Use    Vaping Use: Never used   Substance Use Topics    Alcohol use: Yes    Drug use: Never        Review of Systems   All other systems reviewed and are negative. Physical Exam    Patient's right thumb and hand wounds are healing well. There is no redness drainage or sign of infection. Good overall alignment. Now working on motion recovery. Imaging:    XR Results (most recent):  Results from Appointment encounter on 01/20/23    XR THUMB RT MIN 2 V    Narrative  AP, lateral and oblique x-ray of the right thumb shows good post trapeziectomy space in height with good thumb metacarpal alignment. No change of the radiolucent suspensioplasty system. ASSESSMENT/PLAN:  Below is the assessment and plan developed based on review of pertinent history, physical exam, labs, studies, and medications. The patient's examination is remain consistent with right thumb carpometacarpal joint osteoarthritis as well as right small finger mild stenosing tenosynovitis. She tolerated a right thumb CMC injection well on 1/19/2022. She had considered surgery earlier in the year but now is more convinced and did not want to repeat any injection. She is giving strong consideration for the future to undergo a right thumb CMC arthroplasty. I reviewed treatment options and answered her questions. She states that she is also had a second opinion with another hand surgeon and she would now like to more strongly consider surgical treatment of the right thumb.  She underwent a right thumb carpometacarpal joint arthroplasty with trapezium and partial proximal trapezoid resection and FCR interpositional tendon transfer including suspensioplasty on 1/13/2023. I recommended thumb spica splint for comfort and support and gradual motion and strength recovery exercises. Follow-up in 3 to 4 weeks. 1. Primary osteoarthritis of both first carpometacarpal joints  -     XR THUMB RT MIN 2 V; Future  -     WA WHFO W/O JOINTS PRE OTS  -     REFERRAL TO DME  2. Bilateral hand pain          Return in about 3 weeks (around 2/10/2023). An electronic signature was used to authenticate this note.   -- Allison Sigala MD

## 2023-01-20 ENCOUNTER — OFFICE VISIT (OUTPATIENT)
Dept: ORTHOPEDIC SURGERY | Age: 78
End: 2023-01-20
Payer: MEDICARE

## 2023-01-20 DIAGNOSIS — M18.0 PRIMARY OSTEOARTHRITIS OF BOTH FIRST CARPOMETACARPAL JOINTS: Primary | ICD-10-CM

## 2023-01-20 DIAGNOSIS — M79.642 BILATERAL HAND PAIN: ICD-10-CM

## 2023-01-20 DIAGNOSIS — M79.641 BILATERAL HAND PAIN: ICD-10-CM

## 2023-01-20 NOTE — LETTER
1/20/2023    Patient: Crystal Gibson   YOB: 1945   Date of Visit: 1/20/2023     [place], [address] [facility] [address]  Via Fax: [phone]    Dear Vera Fleming MD,      Thank you for referring Ms. Jayme Cool to [facility] for evaluation. My notes for this consultation are attached. If you have questions, please do not hesitate to call me. I look forward to following your patient along with you.       Sincerely,    Lore Montes MD

## 2023-01-20 NOTE — PATIENT INSTRUCTIONS
Thumb Arthritis: Exercises  Introduction  Here are some examples of exercises for you to try. The exercises may be suggested for a condition or for rehabilitation. Start each exercise slowly. Ease off the exercises if you start to have pain. You will be told when to start these exercises and which ones will work best for you. How to do the exercises  Thumb IP flexion  Place your forearm and hand on a table with your affected thumb pointing up. With your other hand, hold your thumb steady just below the joint nearest your thumbnail. Bend the tip of your thumb downward, then straighten it. Repeat 8 to 12 times. Switch hands and repeat steps 1 through 4, even if only one thumb is sore. Thumb MP flexion  Place your forearm and hand on a table with your affected thumb pointing up. With your other hand, hold the base of your thumb and palm steady. Bend your thumb downward where it meets your palm, then straighten it. Repeat 8 to 12 times. Switch hands and repeat steps 1 through 4, even if only one thumb is sore. Thumb opposition  With your affected hand, point your fingers and thumb straight up. Your wrist should be relaxed, following the line of your fingers and thumb. Touch your affected thumb to each finger, one finger at a time. This will look like an \"okay\" sign, but try to keep your other fingers straight and pointing upward as much as you can. Repeat 8 to 12 times. Switch hands and repeat steps 1 through 3, even if only one thumb is sore. Follow-up care is a key part of your treatment and safety. Be sure to make and go to all appointments, and call your doctor if you are having problems. It's also a good idea to know your test results and keep a list of the medicines you take. Current as of: March 9, 2022               Content Version: 13.4  © 8413-7850 Healthwise, Incorporated.    Care instructions adapted under license by [employer] (which disclaims liability or warranty for this information). If you have questions about a medical condition or this instruction, always ask your healthcare professional. [place] [address] any warranty or liability for your use of this information.

## 2023-01-26 DIAGNOSIS — M18.0 PRIMARY OSTEOARTHRITIS OF BOTH FIRST CARPOMETACARPAL JOINTS: Primary | ICD-10-CM

## 2023-02-09 ENCOUNTER — OFFICE VISIT (OUTPATIENT)
Dept: ORTHOPEDIC SURGERY | Age: 78
End: 2023-02-09
Payer: MEDICARE

## 2023-02-09 ENCOUNTER — OFFICE VISIT (OUTPATIENT)
Dept: ORTHOPEDIC SURGERY | Age: 78
End: 2023-02-09

## 2023-02-09 DIAGNOSIS — M18.0 PRIMARY OSTEOARTHRITIS OF BOTH FIRST CARPOMETACARPAL JOINTS: ICD-10-CM

## 2023-02-09 DIAGNOSIS — M19.031 LOCALIZED PRIMARY OSTEOARTHRITIS OF CARPOMETACARPAL (CMC) JOINT OF RIGHT WRIST: Primary | ICD-10-CM

## 2023-02-09 DIAGNOSIS — M18.0 PRIMARY OSTEOARTHRITIS OF BOTH FIRST CARPOMETACARPAL JOINTS: Primary | ICD-10-CM

## 2023-02-09 DIAGNOSIS — M25.531 RIGHT WRIST PAIN: ICD-10-CM

## 2023-02-09 DIAGNOSIS — M25.641 STIFFNESS OF RIGHT HAND JOINT: ICD-10-CM

## 2023-02-09 DIAGNOSIS — M79.642 BILATERAL HAND PAIN: ICD-10-CM

## 2023-02-09 DIAGNOSIS — M79.641 BILATERAL HAND PAIN: ICD-10-CM

## 2023-02-09 NOTE — PROGRESS NOTES
HPI: Roman Franco (: 1945) is a 78 y.o. female, patient, here for evaluation of the following chief complaint(s):    Surgical Follow-up (Right thumb [facility] [place] joint arthroplasty on 23. Splinted 23.)  Patient is seen today to evaluate her hands. 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 She has had a prior right ring trigger finger release by another physician on 2020. She has known right small finger stenosing tenosynovitis and did receive a corticosteroid injection on 2021. She also has thumb carpometacarpal joint osteoarthritis. She received a corticosteroid injection for right thumb CMC arthritis in 2021. She is still complaining of a component of basal joint arthritic pain. She is not quite ready to consider surgery and instead received a right thumb CMC injection on 2022. She had previously considered undergoing bilateral thumb CMC injections on 2022. She underwent a right thumb carpometacarpal joint arthroplasty with trapezium and partial proximal trapezoid resection and FCR interpositional tendon transfer including suspensioplasty on 2023. Vitals: There were no vitals taken for this visit. There is no height or weight on file to calculate BMI. No Known Allergies    Current Outpatient Medications   Medication Sig    methylPREDNISolone (MEDROL DOSEPACK) 4 mg tablet Per dose pack instructions    albuterol (PROVENTIL HFA, VENTOLIN HFA, PROAIR HFA) 90 mcg/actuation inhaler     estradioL (Vagifem) 10 mcg tab vaginal tablet Vagifem 10 mcg vaginal tablet   1 tablet per vagina nightly for 2 weeks then twice a week    levoFLOXacin (LEVAQUIN) 500 mg tablet     Myrbetriq 25 mg ER tablet     FLUoxetine (PROzac) 10 mg capsule Take 10 mg by mouth daily.     levothyroxine (SYNTHROID) 88 mcg tablet     MAGNESIUM CHLORIDE PO magnesium    collagen, bovine, 100 % powd collagen (bovine) biotin/calcium carbonate (BIOTIN 100+10 PO) biotin     No current facility-administered medications for this visit. Past Medical History:   Diagnosis Date    Arthritis     Cancer [facility])         Past Surgical History:   Procedure Laterality Date    HX KNEE ARTHROSCOPY      HX ORTHOPAEDIC      HX WRIST FRACTURE TX         History reviewed. No pertinent family history. Social History     Tobacco Use    Smoking status: Never    Smokeless tobacco: Never   Vaping Use    Vaping Use: Never used   Substance Use Topics    Alcohol use: Yes    Drug use: Never        Review of Systems   All other systems reviewed and are negative. Physical Exam    Patient's right thumb and hand wounds are well-healed. There is no redness drainage or sign of infection. Good overall alignment. Now working on motion recovery. Imaging:    XR Results (most recent):  Results from Appointment encounter on 02/09/23    XR THUMB RT MIN 2 V    Narrative  AP, lateral and oblique x-ray of the right thumb shows good post trapeziectomy space in height with good thumb metacarpal alignment and no change of the radiolucent suspensioplasty system    ASSESSMENT/PLAN:  Below is the assessment and plan developed based on review of pertinent history, physical exam, labs, studies, and medications. The patient's examination is remain consistent with right thumb carpometacarpal joint osteoarthritis as well as right small finger mild stenosing tenosynovitis. She tolerated a right thumb CMC injection well on 1/19/2022. She had considered surgery earlier in the year but now is more convinced and did not want to repeat any injection. She is giving strong consideration for the future to undergo a right thumb CMC arthroplasty. I reviewed treatment options and answered her questions. She states that she is also had a second opinion with another hand surgeon and she would now like to more strongly consider surgical treatment of the right thumb. She underwent a right thumb carpometacarpal joint arthroplasty with trapezium and partial proximal trapezoid resection and FCR interpositional tendon transfer including suspensioplasty on 1/13/2023. I recommended thumb spica splint for comfort and support and gradual motion and strength recovery exercises. She would also like to attend outpatient therapy. She is doing well and can slowly wean from the brace. She may continue with home exercises as well and I plan to see her back in 4 to 6 weeks. 1. Primary osteoarthritis of both first carpometacarpal joints  -     XR THUMB RT MIN 2 V; Future  -     REFERRAL TO PHYSICAL THERAPY  2. Bilateral hand pain          Return in about 4 weeks (around 3/9/2023). An electronic signature was used to authenticate this note.   -- Siri Mccarthy MD

## 2023-02-09 NOTE — LETTER
2/9/2023    Patient: Nick    YOB: 1945   Date of Visit: 2/9/2023     Eva Prajapati MD  [place] [address]  [address] [facility] [address]  Via Fax: [phone]    Dear Eva Prajapati MD,      Thank you for referring Ms. Riaz Pizano to [facility] for evaluation. My notes for this consultation are attached. If you have questions, please do not hesitate to call me. I look forward to following your patient along with you.       Sincerely,    Kyle Foote MD

## 2023-02-09 NOTE — PROGRESS NOTES
Patient Name: Pat Downs  Date:2023  : 1945  [x]  Patient  Verified  Payor: Familia Dumont / Plan: [place] MED[place] / Product Type: Medicare /    Total Treatment Time (min): 50  Total Timed Codes (min): 50  1:1 Treatment Time ( [address] only): 50   Visit #: 1   Referring Provider: Claudia Le MD    Physical exam: Right wrist and hand    ICD-10-CM ICD-9-CM    1. Localized primary osteoarthritis of carpometacarpal [place]za) joint of right wrist  M19.031 715.14       2. Right wrist pain  M25.531 719.43       3. Stiffness of right hand joint  M25.641 719.54          Subjective: The patient is a pleasant and active 77-year-old female. She is referred to physical therapy by Dr. Dania Corona following a a right thumb carpometacarpal joint arthroplasty with trapezium and partial proximal trapezoid resection and FCR interpositional tendon transfer including suspensioplasty on 2023. Patient reports that her right hand has bothered her for multiple years with inability to complete recreational and basic ADLs including household tasks in addition to painting and gardening. She underwent a corticosteroid injection yet was unable to return to premorbid level and sought surgical intervention. She presents today for evaluation and treatment for restoration of range of motion to the right hand and thumb to return to premorbid functional level  Past Medical History:   Diagnosis Date    Arthritis     Cancer ([facility] [address]      Current Outpatient Medications   Medication Instructions    albuterol (PROVENTIL HFA, VENTOLIN HFA, PROAIR HFA) 90 mcg/actuation inhaler No dose, route, or frequency recorded.     biotin/calcium carbonate (BIOTIN 100+10 PO) biotin    collagen, bovine, 100 % powd collagen (bovine)    estradioL (Vagifem) 10 mcg tab vaginal tablet Vagifem 10 mcg vaginal tablet   1 tablet per vagina nightly for 2 weeks then twice a week    FLUoxetine (PROZAC) 10 mg, Oral, DAILY    levoFLOXacin (LEVAQUIN) 500 mg tablet No dose, route, or frequency recorded. levothyroxine (SYNTHROID) 88 mcg tablet No dose, route, or frequency recorded. MAGNESIUM CHLORIDE PO magnesium    methylPREDNISolone (MEDROL DOSEPACK) 4 mg tablet Per dose pack instructions    Myrbetriq 25 mg ER tablet No dose, route, or frequency recorded. Past Surgical History:   Procedure Laterality Date    HX KNEE ARTHROSCOPY      HX ORTHOPAEDIC      HX WRIST FRACTURE TX             Objective: Inspection:right eveals healed incision over the dorsal aspect of the [facility] joint right hand there are no signs of infection     The patient's right hand reveals mild swelling noted over the right CMC joint. All incisions well-healed no signs of infection    Pain:  The patient reports pain with functional activities 1-6/ 10 VAS scale. Range of motion: (passive /active)   CMC flexion 25% loss   CMC extension 20% loss  CMC abduction full  CMC abduction 30% loss    Wrist range of motion  Flexion:    30°      Extension: 40°  Pronation: Full°      Supination: Full°  Strength:  strength reveals at le 50% loss with lateral pinch CMC extension and cylindrical   Special Tests: Negative AIN      Treatment:  Low complexity evaluation right wrist and hand 20    Therapeutic activity instruction 10 minutes  1. Theraputty use CMC flexion extension  2. Kg use for lateral key  and cylindrical   3. Self-directed soft tissue massage  4. Thermal agent application and use     Manual therapy: 20 minutes  Myofascial release to the incisional area in conjunction with [facility] joint mobilization    Assessment:  1. Localized primary osteoarthritis of carpometacarpal (CMC) joint of right wrist  2. Right wrist pain  3. Stiffness of right hand joint      Physical therapy goals    Long-term goal -8 weeks  1. The patient will independently perform all self-directed ADLs without exacerbation of pain from baseline.   2.  Patient will demonstrate full range of motion of the involved hand.  Short-term goal 3 weeks. 1.  Independent demonstration of a home exercise program to facilitate recovery. Physical therapy plan of care    Treatment frequency 2 times X weekly. Duration 20. Focus of therapy will be on progressive restoration of range of motion and strength, balance, and functional mobility to return to pre injury status. Therapeutic applications will include but are not limited to:  Home exercise program development and implementation with updating as needed. Intramuscular dry needling to the involved region. Manual therapy, joint mobilization, myofascial release, therapeutic exercises. Modalities including ultrasound and electric stimulation heat and ice. Kinesio-tape and Estrada taping for joint reeducation and approximation of tissue for neuromuscular reeducation. Lucian Luevano PT          The referring physician has reviewed and approved this evaluation and plan of care as noted by the electronic signature attached to note.

## 2023-02-09 NOTE — PATIENT INSTRUCTIONS
Thumb Arthritis: Exercises  Introduction  Here are some examples of exercises for you to try. The exercises may be suggested for a condition or for rehabilitation. Start each exercise slowly. Ease off the exercises if you start to have pain. You will be told when to start these exercises and which ones will work best for you. How to do the exercises  Thumb IP flexion  Place your forearm and hand on a table with your affected thumb pointing up. With your other hand, hold your thumb steady just below the joint nearest your thumbnail. Bend the tip of your thumb downward, then straighten it. Repeat 8 to 12 times. Switch hands and repeat steps 1 through 4, even if only one thumb is sore. Thumb MP flexion  Place your forearm and hand on a table with your affected thumb pointing up. With your other hand, hold the base of your thumb and palm steady. Bend your thumb downward where it meets your palm, then straighten it. Repeat 8 to 12 times. Switch hands and repeat steps 1 through 4, even if only one thumb is sore. Thumb opposition  With your affected hand, point your fingers and thumb straight up. Your wrist should be relaxed, following the line of your fingers and thumb. Touch your affected thumb to each finger, one finger at a time. This will look like an \"okay\" sign, but try to keep your other fingers straight and pointing upward as much as you can. Repeat 8 to 12 times. Switch hands and repeat steps 1 through 3, even if only one thumb is sore. Follow-up care is a key part of your treatment and safety. Be sure to make and go to all appointments, and call your doctor if you are having problems. It's also a good idea to know your test results and keep a list of the medicines you take. Current as of: March 9, 2022               Content Version: 13.4  © 0957-9438 [employer].    Care instructions adapted under license by [employer] (which disclaims liability or warranty for this information). If you have questions about a medical condition or this instruction, always ask your healthcare professional. [place] [address] any warranty or liability for your use of this information.

## 2023-02-14 ENCOUNTER — OFFICE VISIT (OUTPATIENT)
Dept: ORTHOPEDIC SURGERY | Age: 78
End: 2023-02-14
Payer: MEDICARE

## 2023-02-14 DIAGNOSIS — M19.031 LOCALIZED PRIMARY OSTEOARTHRITIS OF CARPOMETACARPAL (CMC) JOINT OF RIGHT WRIST: Primary | ICD-10-CM

## 2023-02-14 DIAGNOSIS — M25.531 RIGHT WRIST PAIN: ICD-10-CM

## 2023-02-14 DIAGNOSIS — M25.641 STIFFNESS OF RIGHT HAND JOINT: ICD-10-CM

## 2023-02-14 NOTE — PROGRESS NOTES
PT DAILY Progress Note    Patient Name: Do Chery  2023  : 1945  [x]  Patient  Verified  Payor: Alla Mcdonald / Plan: [place] MEDICARE PART A & B / Product Type: Medicare /    Total Treatment Time (min): 40  Total Timed Codes (min): 30    Referring Physician:   Zaki Palacios MD         1. Localized primary osteoarthritis of carpometacarpal (CMC) joint of right wrist  2. Right wrist pain  3. Stiffness of right hand joint      SUBJECTIVE  Subjective functional status/changes:   [] No changes reported    Patient reports she has not questions with her home program.  She reports she continues to have soreness at the base of her thumb    OBJECTIVE/TREATMENT   Manual therapy: 20 minutes  Myofascial release to the incisional area in conjunction with CMC joint mobilization and passive range of motion    Therapeutic exercise 10 minutes  1. Review Theraputty use CMC flexion extension  2. Prayer stretch  3. Wrist flexion stretch table edge  4. T key  CMC AB duction          Added/Changed Exercises:  [x]  Advanced to address: [x] functional strength/ROM deficits [] balance/proprioceptive tasks  []  Modified: [] per subjective reports [] for patient time constraints [] for clinic time constraints    Modality:  []  Ice pack: post      [x]  Hot pack: pre10  []  Other:         Patient Education: [x] Review HEP    [] Progressed/Changed HEP based on:  [] positioning   [] body mechanics   [] transfers   [] heat/ice application        ASSESSMENT  []  See Plan of Care  []  See progress note/recertification  [x]  Patient will continue to benefit from skilled therapy to address remaining functional deficits:     Patient continues to have pain and mobility restriction wrist flexion extension as well as at the [facility] joint. She is doing well swelling does remain.   Cues with exercises required continued need for manual therapy to advance    Progress towards goals / Updated goals:    PLAN  [x]  Upgrade activities as tolerated [x]  Continue plan of care  []  Discharge due to:_  [] Other:_      Return in about 2 days (around 2/16/2023) for [id number] [place] [address] physical therapy.      Nanda Starks, PT 2/14/2023

## 2023-02-14 NOTE — PROGRESS NOTES
Patient Name: Christina Agrawal  Date:2023  : 1945  [x]  Patient  Verified  Payor: Analilia Robertson / Plan: [place] MEDICARE PART A & B / Product Type: Medicare /    Total Treatment Time (min): 50  Total Timed Codes (min): 50  1:1 Treatment Time ( [address] only): 50   Visit #: 1   Referring Provider: Stephanie Starkey MD    Physical exam: Right wrist and hand    ICD-10-CM ICD-9-CM    1. Localized primary osteoarthritis of carpometacarpal [place]) joint of right wrist  M19.031 715.14       2. Right wrist pain  M25.531 719.43       3. Stiffness of right hand joint  M25.641 719.54          Subjective: The patient is a pleasant and active 70-year-old female. She is referred to physical therapy by Dr. Cipriano Bernal following a a right thumb carpometacarpal joint arthroplasty with trapezium and partial proximal trapezoid resection and FCR interpositional tendon transfer including suspensioplasty on 2023. Patient reports that her right hand has bothered her for multiple years with inability to complete recreational and basic ADLs including household tasks in addition to painting and gardening. She underwent a corticosteroid injection yet was unable to return to premorbid level and sought surgical intervention. She presents today for evaluation and treatment for restoration of range of motion to the right hand and thumb to return to premorbid functional level  Past Medical History:   Diagnosis Date    Arthritis     Cancer ([facility] [address]      Current Outpatient Medications   Medication Instructions    albuterol (PROVENTIL HFA, VENTOLIN HFA, PROAIR HFA) 90 mcg/actuation inhaler No dose, route, or frequency recorded.     biotin/calcium carbonate (BIOTIN 100+10 PO) biotin    collagen, bovine, 100 % powd collagen (bovine)    estradioL (Vagifem) 10 mcg tab vaginal tablet Vagifem 10 mcg vaginal tablet   1 tablet per vagina nightly for 2 weeks then twice a week    FLUoxetine (PROZAC) 10 mg, Oral, DAILY    levoFLOXacin (LEVAQUIN) 500 mg tablet No dose, route, or frequency recorded. levothyroxine (SYNTHROID) 88 mcg tablet No dose, route, or frequency recorded. MAGNESIUM CHLORIDE PO magnesium    methylPREDNISolone (MEDROL DOSEPACK) 4 mg tablet Per dose pack instructions    Myrbetriq 25 mg ER tablet No dose, route, or frequency recorded. Past Surgical History:   Procedure Laterality Date    HX KNEE ARTHROSCOPY      HX ORTHOPAEDIC      HX WRIST FRACTURE TX             Objective: Inspection:right eveals healed incision over the dorsal aspect of the [facility] joint right hand there are no signs of infection     The patient's right hand reveals mild swelling noted over the right CMC joint. All incisions well-healed no signs of infection    Pain:  The patient reports pain with functional activities 1-6/ 10 VAS scale. Range of motion: (passive /active)   CMC flexion 25% loss   CMC extension 20% loss  CMC abduction full  CMC abduction 30% loss    Wrist range of motion  Flexion:    30°      Extension: 40°  Pronation: Full°      Supination: Full°  Strength:  strength reveals at le 50% loss with lateral pinch CMC extension and cylindrical   Special Tests: Negative AIN      Treatment:  Low complexity evaluation right wrist and hand 20    Therapeutic activity instruction 10 minutes  1. Theraputty use CMC flexion extension  2. Kg use for lateral key  and cylindrical   3. Self-directed soft tissue massage  4. Thermal agent application and use     Manual therapy: 20 minutes  Myofascial release to the incisional area in conjunction with [facility] joint mobilization    Assessment:  1. Localized primary osteoarthritis of carpometacarpal (CMC) joint of right wrist  2. Right wrist pain  3. Stiffness of right hand joint      Physical therapy goals    Long-term goal -8 weeks  1. The patient will independently perform all self-directed ADLs without exacerbation of pain from baseline.   2.  Patient will demonstrate full range of motion of the involved hand.  Short-term goal 3 weeks. 1.  Independent demonstration of a home exercise program to facilitate recovery. Physical therapy plan of care    Treatment frequency 2 times X weekly. Duration 20. Focus of therapy will be on progressive restoration of range of motion and strength, balance, and functional mobility to return to pre injury status. Therapeutic applications will include but are not limited to:  Home exercise program development and implementation with updating as needed. Intramuscular dry needling to the involved region. Manual therapy, joint mobilization, myofascial release, therapeutic exercises. Modalities including ultrasound and electric stimulation heat and ice. Kinesio-tape and Estrada taping for joint reeducation and approximation of tissue for neuromuscular reeducation. Kassi Ortiz PT          The referring physician has reviewed and approved this evaluation and plan of care as noted by the electronic signature attached to note.

## 2023-02-16 ENCOUNTER — OFFICE VISIT (OUTPATIENT)
Dept: ORTHOPEDIC SURGERY | Age: 78
End: 2023-02-16
Payer: MEDICARE

## 2023-02-16 DIAGNOSIS — M19.031 LOCALIZED PRIMARY OSTEOARTHRITIS OF CARPOMETACARPAL (CMC) JOINT OF RIGHT WRIST: Primary | ICD-10-CM

## 2023-02-16 DIAGNOSIS — M25.641 STIFFNESS OF RIGHT HAND JOINT: ICD-10-CM

## 2023-02-16 DIAGNOSIS — M25.531 RIGHT WRIST PAIN: ICD-10-CM

## 2023-02-16 NOTE — PROGRESS NOTES
PT DAILY Progress Note    Patient Name: Gianni Aaron  2023  : 1945  [x]  Patient  Verified  Payor: Guillermina Bob / Plan: [place] MEDICARE PART A & B / Product Type: Medicare /    Total Treatment Time (min): 40  Total Timed Codes (min): 30    Referring Physician:   Gerardo Mclain MD         1. Localized primary osteoarthritis of carpometacarpal (CMC) joint of right wrist  2. Right wrist pain  3. Stiffness of right hand joint      SUBJECTIVE  Subjective functional status/changes:   [x] No changes reported      OBJECTIVE/TREATMENT   Manual therapy: 20 minutes  Myofascial release to the incisional area in conjunction with CMC joint mobilization and passive range of motion    Therapeutic exercise 10 minutes  1. Therapy and scar release technique instruction  2. Thera-Band CMC joint strengthening activities abduction flexion extension adduction  3. Wrist flexion stretch table edge  4. Prayer stretch            Added/Changed Exercises:  [x]  Advanced to address: [x] functional strength/ROM deficits [] balance/proprioceptive tasks  []  Modified: [] per subjective reports [] for patient time constraints [] for clinic time constraints    Modality:  []  Ice pack: post      [x]  Hot pack: pre10  []  Other:         Patient Education: [x] Review HEP    [] Progressed/Changed HEP based on:  [] positioning   [] body mechanics   [] transfers   [] heat/ice application        ASSESSMENT  []  See Plan of Care  []  See progress note/recertification  [x]  Patient will continue to benefit from skilled therapy to address remaining functional deficits:     Mobility and functional strength loss remain. She continues to have swelling over the base of the [facility]. She is doing well with progressive instruction in her home activity and exercises.     Progress towards goals / Updated goals:    PLAN  [x]  Upgrade activities as tolerated      [x]  Continue plan of care  []  Discharge due to:_  [] Other:_      Return in about 5 days (around 2/21/2023) for [facility] physical therapy.      Yocasta Trevino, PT 2/16/2023

## 2023-02-21 ENCOUNTER — OFFICE VISIT (OUTPATIENT)
Dept: ORTHOPEDIC SURGERY | Age: 78
End: 2023-02-21
Payer: MEDICARE

## 2023-02-21 DIAGNOSIS — M25.641 STIFFNESS OF RIGHT HAND JOINT: ICD-10-CM

## 2023-02-21 DIAGNOSIS — M25.531 RIGHT WRIST PAIN: ICD-10-CM

## 2023-02-21 DIAGNOSIS — M19.031 LOCALIZED PRIMARY OSTEOARTHRITIS OF CARPOMETACARPAL (CMC) JOINT OF RIGHT WRIST: Primary | ICD-10-CM

## 2023-02-21 PROCEDURE — 97110 THERAPEUTIC EXERCISES: CPT | Performed by: PHYSICAL THERAPIST

## 2023-02-21 PROCEDURE — 97140 MANUAL THERAPY 1/> REGIONS: CPT | Performed by: PHYSICAL THERAPIST

## 2023-02-23 ENCOUNTER — OFFICE VISIT (OUTPATIENT)
Dept: ORTHOPEDIC SURGERY | Age: 78
End: 2023-02-23
Payer: MEDICARE

## 2023-02-23 DIAGNOSIS — M25.531 RIGHT WRIST PAIN: ICD-10-CM

## 2023-02-23 DIAGNOSIS — M19.031 LOCALIZED PRIMARY OSTEOARTHRITIS OF CARPOMETACARPAL (CMC) JOINT OF RIGHT WRIST: Primary | ICD-10-CM

## 2023-02-23 DIAGNOSIS — M25.641 STIFFNESS OF RIGHT HAND JOINT: ICD-10-CM

## 2023-02-23 NOTE — PROGRESS NOTES
PT DAILY Progress Note    Patient Name: Eugenio Gay  2023  : 1945  [x]  Patient  Verified  Payor: Tia Schaffer / Plan: [place] MEDICARE PART A & B / Product Type: Medicare /    Total Treatment Time (min): 40  Total Timed Codes (min): 30    Referring Physician:   Salud Taylor MD         1. Localized primary osteoarthritis of carpometacarpal (CMC) joint of right wrist  2. Right wrist pain  3. Stiffness of right hand joint      SUBJECTIVE  Subjective functional status/changes:   [x] No changes reported  Patient reports she had pain stiffness and soreness with driving over the last 5 days. She reports her trip to [place] and back she does report she was able to complete this    OBJECTIVE/TREATMENT   Manual therapy: 20 minutes  Myofascial release to the incisional area in conjunction with [facility] joint mobilization and passive range of motion    Therapeutic exercise 10 minutes  1. Therapy and scar release technique instruction  2. Thera-Band CMC joint strengthening activities abduction flexion extension adduction  3. Wrist flexion stretch table edge  4. Prayer stretch  5. Scar mobilization with latex Thera-Band  6. Active range of motion with resistance via Thera-Band    Added/Changed Exercises:  [x]  Advanced to address: [x] functional strength/ROM deficits [] balance/proprioceptive tasks  []  Modified: [] per subjective reports [] for patient time constraints [] for clinic time constraints    Modality:  []  Ice pack: post      [x]  Hot pack: pre10  []  Other:         Patient Education: [x] Review HEP    [] Progressed/Changed HEP based on:  [] positioning   [] body mechanics   [] transfers   [] heat/ice application        ASSESSMENT  []  See Plan of Care  []  See progress note/recertification  [x]  Patient will continue to benefit from skilled therapy to address remaining functional deficits:     Mobility has improved by 50% from initial visit.   She does continue to have swelling and pain myofascial restriction and functional strength loss she will continue to require PT to return to premorbid functional level        PLAN  [x]  Upgrade activities as tolerated      [x]  Continue plan of care  []  Discharge due to:_  [] Other:_      Return in about 2 days (around 2/23/2023) for [address] [place] [address] physical therapy.      Regina Sherman, PT 2/23/2023

## 2023-02-23 NOTE — PROGRESS NOTES
PT DAILY Progress Note    Patient Name: Aurelio Nunez  2023  : 1945  [x]  Patient  Verified  Payor: Gilda Childress / Plan: [place] MEDICARE PART A & B / Product Type: Medicare /    Total Treatment Time (min): 50 pain   Total Timed Codes (min): 30    Referring Physician:   Lakisha Salinas MD         1. Localized primary osteoarthritis of carpometacarpal (CMC) joint of right wrist  2. Right wrist pain  3. Stiffness of right hand joint      SUBJECTIVE  Subjective functional status/changes:   [x] No changes reported    OBJECTIVE/TREATMENT   Manual therapy: 20 minutes  Myofascial release to the incisional area in conjunction with CMC joint mobilization and passive range of motion    Therapeutic exercise 10 minutes  1. Therapy and scar release technique instruction  2. Thera-Band CMC joint strengthening activities abduction flexion extension adduction  3. Wrist flexion stretch table edge  4. Prayer stretch  5. Scar mobilization with latex Thera-Band  6. Passive range of motion thumb and wrist    Added/Changed Exercises:  [x]  Advanced to address: [x] functional strength/ROM deficits [] balance/proprioceptive tasks  []  Modified: [] per subjective reports [] for patient time constraints [] for clinic time constraints    Modality:  [x]  Ice pack: post    10  [x]  Hot pack: pre10  []  Other:         Patient Education: [x] Review HEP    [] Progressed/Changed HEP based on:  [] positioning   [] body mechanics   [] transfers   [] heat/ice application        ASSESSMENT  []  See Plan of Care  []  See progress note/recertification  [x]  Patient will continue to benefit from skilled therapy to address remaining functional deficits:     remains however functional mobility at the [facility] joint is showing improvement she continues to lack strength with key  and cylindrical  we will continue with progressive strengthening and activities to return to premorbid level.         PLAN  [x]  Upgrade activities as tolerated [x]  Continue plan of care  []  Discharge due to:_  [] Other:_      Return in about 5 days (around 2/28/2023) for [id number] [place] [address] physical therapy.      Ifrah Tirado, PT 2/23/2023

## 2023-02-28 ENCOUNTER — OFFICE VISIT (OUTPATIENT)
Dept: ORTHOPEDIC SURGERY | Age: 78
End: 2023-02-28
Payer: MEDICARE

## 2023-02-28 DIAGNOSIS — M19.031 LOCALIZED PRIMARY OSTEOARTHRITIS OF CARPOMETACARPAL (CMC) JOINT OF RIGHT WRIST: Primary | ICD-10-CM

## 2023-02-28 DIAGNOSIS — M25.641 STIFFNESS OF RIGHT HAND JOINT: ICD-10-CM

## 2023-02-28 DIAGNOSIS — M25.531 RIGHT WRIST PAIN: ICD-10-CM

## 2023-02-28 PROCEDURE — 97140 MANUAL THERAPY 1/> REGIONS: CPT | Performed by: PHYSICAL THERAPIST

## 2023-02-28 NOTE — PROGRESS NOTES
PT DAILY Progress Note    Patient Name: Crystal Gibson  2023  : 1945  [x]  Patient  Verified  Payor: Waleska Rizvi / Plan: [place] MEDICARE PART A & B / Product Type: Medicare /    Total Treatment Time (min): 50   Total Timed Codes (min): 30    Referring Physician:   Leonardo Baltazar MD         1. Localized primary osteoarthritis of carpometacarpal (CMC) joint of right wrist  2. Right wrist pain  3. Stiffness of right hand joint      SUBJECTIVE  Subjective functional status/changes:   [x] No changes reported  Continues to complain of pain and soreness. OBJECTIVE/TREATMENT   Manual therapy: 25 minutes  Myofascial release to the incisional area in conjunction with CMC joint mobilization and passive range of motion    Therapeutic exercise 5 minutes  1. Therapy and scar release technique instruction  2. Thera-Band CMC joint strengthening activities abduction flexion extension adduction  3. Wrist flexion stretch table edge  4. Prayer stretch  5. Scar mobilization with latex Thera-Band  6. Passive range of motion thumb and wrist    Added/Changed Exercises:  [x]  Advanced to address: [x] functional strength/ROM deficits [] balance/proprioceptive tasks  []  Modified: [] per subjective reports [] for patient time constraints [] for clinic time constraints    Modality:  [x]  Ice pack: post    10  [x]  Hot pack: pre10  []  Other:         Patient Education: [x] Review HEP    [] Progressed/Changed HEP based on:  [] positioning   [] body mechanics   [] transfers   [] heat/ice application        ASSESSMENT  []  See Plan of Care  []  See progress note/recertification  [x]  Patient will continue to benefit from skilled therapy to address remaining functional deficits:     PLAN  [x]  Upgrade activities as tolerated      [x]  Continue plan of care  []  Discharge due to:_  [] Other:_      Return in about 2 days (around 3/2/2023) for [address] [facility] physical therapy.      Sharif Schaefer, PT 2023

## 2023-03-01 ENCOUNTER — OFFICE VISIT (OUTPATIENT)
Dept: ORTHOPEDIC SURGERY | Age: 78
End: 2023-03-01

## 2023-03-01 DIAGNOSIS — M25.641 STIFFNESS OF RIGHT HAND JOINT: ICD-10-CM

## 2023-03-01 DIAGNOSIS — M25.531 RIGHT WRIST PAIN: ICD-10-CM

## 2023-03-01 DIAGNOSIS — M19.031 LOCALIZED PRIMARY OSTEOARTHRITIS OF CARPOMETACARPAL (CMC) JOINT OF RIGHT WRIST: Primary | ICD-10-CM

## 2023-03-01 NOTE — PROGRESS NOTES
PT DAILY Progress Note    Patient Name: Roman Franco  3/1/2023  : 1945  [x]  Patient  Verified  Payor: Dannynurys Radha / Plan: [place] / Product Type: Medicare /    Total Treatment Time (min): 60   Total Timed Codes (min): 40    Referring Physician:   Dae Jacques MD         1. Localized primary osteoarthritis of carpometacarpal (CMC) joint of right wrist  2. Right wrist pain  3. Stiffness of right hand joint      SUBJECTIVE  Subjective functional status/changes:   [x] No changes reported      OBJECTIVE/TREATMENT   Manual therapy: 25 minutes  Myofascial release to the incisional area in conjunction with CMC joint mobilization and passive range of motion    Therapeutic exercise 15 minutes  1. Therapy and scar release technique instruction  2. Flex bar  3. Wrist flexion stretch table edge  4. Prayer stretch  5.  Web hand exerciser   6. Passive range of motion thumb and wrist  7. Pronation supination small hammer    Added/Changed Exercises:  [x]  Advanced to address: [x] functional strength/ROM deficits [] balance/proprioceptive tasks  []  Modified: [] per subjective reports [] for patient time constraints [] for clinic time constraints    Modality:  [x]  Ice pack: post    10  [x]  Hot pack: pre10  []  Other:         Patient Education: [x] Review HEP    [] Progressed/Changed HEP based on:  [] positioning   [] body mechanics   [] transfers   [] heat/ice application        ASSESSMENT  []  See Plan of Care  []  See progress note/recertification  [x]  Patient will continue to benefit from skilled therapy to address remaining functional deficits:     Mobility strength and functional loss remains.   She continues to show benefit with the skilled therapy manual based passive range of motion and progressive strengthening advancement we will follow her again early next week    PLAN  [x]  Upgrade activities as tolerated      [x]  Continue plan of care  []  Discharge due to:_  [] Other:_      Return in about 5 days (around 3/6/2023) for CONTIUED SKILLED physical therapy.      Carie Kitchen, PT 3/1/2023

## 2023-03-07 ENCOUNTER — OFFICE VISIT (OUTPATIENT)
Dept: ORTHOPEDIC SURGERY | Age: 78
End: 2023-03-07

## 2023-03-07 DIAGNOSIS — M25.531 RIGHT WRIST PAIN: ICD-10-CM

## 2023-03-07 DIAGNOSIS — M19.031 LOCALIZED PRIMARY OSTEOARTHRITIS OF CARPOMETACARPAL (CMC) JOINT OF RIGHT WRIST: Primary | ICD-10-CM

## 2023-03-07 DIAGNOSIS — M25.641 STIFFNESS OF RIGHT HAND JOINT: ICD-10-CM

## 2023-03-07 NOTE — PROGRESS NOTES
PT DAILY Progress Note    Patient Name: Jeanne Jones  3/7/2023  : 1945  [x]  Patient  Verified  Payor: Herminio Carr / Plan: [place] MEDICARE PART [place] & B / Product Type: Medicare /    Total Treatment Time (min): 60   Total Timed Codes (min): 40    Referring Physician:   Kvng Loya MD         1. Localized primary osteoarthritis of carpometacarpal (CMC) joint of right wrist  2. Right wrist pain  3. Stiffness of right hand joint      SUBJECTIVE  Subjective functional status/changes:   [x] No changes reported  Was away for 5 days had some trouble doing exercises secondary to this but doing okay    OBJECTIVE/TREATMENT   Manual therapy: 25 minutes  Myofascial release to the incisional   Mobilization to the [facility] joint passive stretching    Therapeutic exercise 15 minutes  1. Therapy and scar release technique instruction  2. Flex bar  3. Wrist flexion stretch table edge  4. Prayer stretch  5.  Web hand exerciser   6. Passive range of motion thumb and wrist  7. Pronation supination small hammer    Added/Changed Exercises:  [x]  Advanced to address: [x] functional strength/ROM deficits [] balance/proprioceptive tasks  []  Modified: [] per subjective reports [] for patient time constraints [] for clinic time constraints    Modality:  [x]  Ice pack: post    10  [x]  Hot pack: pre10  []  Other:         Patient Education: [x] Review HEP    [] Progressed/Changed HEP based on:  [] positioning   [] body mechanics   [] transfers   [] heat/ice application        ASSESSMENT  []  See Plan of Care  []  See progress note/recertification  [x]  Patient will continue to benefit from skilled therapy to address remaining functional deficits:     Mobility continues to show improvement. We will continue to follow her biweekly    PLAN  [x]  Upgrade activities as tolerated      [x]  Continue plan of care  []  Discharge due to:_  [] Other:_      Return in about 2 days (around 3/9/2023) for [address] [place] [address] physical therapy.      Inga Rolle Kacie Luu, PT 3/7/2023

## 2023-03-09 ENCOUNTER — OFFICE VISIT (OUTPATIENT)
Dept: ORTHOPEDIC SURGERY | Age: 78
End: 2023-03-09

## 2023-03-09 DIAGNOSIS — M25.641 STIFFNESS OF RIGHT HAND JOINT: ICD-10-CM

## 2023-03-09 DIAGNOSIS — M19.031 LOCALIZED PRIMARY OSTEOARTHRITIS OF CARPOMETACARPAL (CMC) JOINT OF RIGHT WRIST: Primary | ICD-10-CM

## 2023-03-09 DIAGNOSIS — M79.641 BILATERAL HAND PAIN: ICD-10-CM

## 2023-03-09 DIAGNOSIS — M18.0 PRIMARY OSTEOARTHRITIS OF BOTH FIRST CARPOMETACARPAL JOINTS: ICD-10-CM

## 2023-03-09 DIAGNOSIS — M25.531 RIGHT WRIST PAIN: ICD-10-CM

## 2023-03-09 DIAGNOSIS — M79.642 BILATERAL HAND PAIN: ICD-10-CM

## 2023-03-09 NOTE — PROGRESS NOTES
HPI: Montez Velazco (: 1945) is a 78 y.o. female, patient, here for evaluation of the following chief complaint(s):    Surgical Follow-up (Right thumb [facility] joint arthroplasty on 23. Splinted 23.)  Patient is seen today to evaluate her hands. 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 She has had a prior right ring trigger finger release by another physician on 2020. She has known right small finger stenosing tenosynovitis and did receive a corticosteroid injection on 2021. She also has thumb carpometacarpal joint osteoarthritis. She received a corticosteroid injection for right thumb CMC arthritis in 2021. She is still complaining of a component of basal joint arthritic pain. She is not quite ready to consider surgery and instead received a right thumb CMC injection on 2022. She had previously considered undergoing bilateral thumb CMC injections on 2022. She underwent a right thumb carpometacarpal joint arthroplasty with trapezium and partial proximal trapezoid resection and FCR interpositional tendon transfer including suspensioplasty on 2023. She has been involved in outpatient therapy. Vitals: There were no vitals taken for this visit. There is no height or weight on file to calculate BMI. No Known Allergies    Current Outpatient Medications   Medication Sig    methylPREDNISolone (MEDROL DOSEPACK) 4 mg tablet Per dose pack instructions    albuterol (PROVENTIL HFA, VENTOLIN HFA, PROAIR HFA) 90 mcg/actuation inhaler     estradioL (Vagifem) 10 mcg tab vaginal tablet Vagifem 10 mcg vaginal tablet   1 tablet per vagina nightly for 2 weeks then twice a week    levoFLOXacin (LEVAQUIN) 500 mg tablet     Myrbetriq 25 mg ER tablet     FLUoxetine (PROzac) 10 mg capsule Take 10 mg by mouth daily.     levothyroxine (SYNTHROID) 88 mcg tablet     MAGNESIUM CHLORIDE PO magnesium collagen, bovine, 100 % powd collagen (bovine)    biotin/calcium carbonate (BIOTIN 100+10 PO) biotin     No current facility-administered medications for this visit. Past Medical History:   Diagnosis Date    Arthritis     Cancer [facility])         Past Surgical History:   Procedure Laterality Date    HX KNEE ARTHROSCOPY      HX ORTHOPAEDIC      HX WRIST FRACTURE TX         History reviewed. No pertinent family history. Social History     Tobacco Use    Smoking status: Never    Smokeless tobacco: Never   Vaping Use    Vaping Use: Never used   Substance Use Topics    Alcohol use: Yes    Drug use: Never        Review of Systems   All other systems reviewed and are negative. Physical Exam    Patient's right thumb and hand wounds are well-healed. There is no redness drainage or sign of infection. Good overall alignment. Now working on motion recovery. She can oppose her thumb to the PIP crease of the small finger and has some stiffness with abduction. Imaging:    XR Results (most recent):  Results from Appointment encounter on 03/09/23    XR THUMB RT MIN 2 V    Narrative  AP, lateral and oblique x-ray of the right thumb shows good post trapeziectomy space in height with good thumb metacarpal alignment and no change of the suspensioplasty radiolucent system. ASSESSMENT/PLAN:  Below is the assessment and plan developed based on review of pertinent history, physical exam, labs, studies, and medications. The patient's examination is remain consistent with right thumb carpometacarpal joint osteoarthritis as well as right small finger mild stenosing tenosynovitis. She tolerated a right thumb CMC injection well on 1/19/2022. She had considered surgery earlier in the year but now is more convinced and did not want to repeat any injection. She is giving strong consideration for the future to undergo a right thumb CMC arthroplasty. I reviewed treatment options and answered her questions.   She states that she is also had a second opinion with another hand surgeon and she would now like to more strongly consider surgical treatment of the right thumb. She underwent a right thumb carpometacarpal joint arthroplasty with trapezium and partial proximal trapezoid resection and FCR interpositional tendon transfer including suspensioplasty on 1/13/2023. She has a thumb spica splint available to utilize for comfort and support when needed. She is also involved in outpatient therapy. She has started to wean from the brace and work to further improve motion and strength with home exercises. Overall she is improving and I plan to see her back in 6 to 8 weeks for what may be a final visit. 1. Localized primary osteoarthritis of carpometacarpal (CMC) joint of right wrist  2. Right wrist pain  3. Primary osteoarthritis of both first carpometacarpal joints [M18.0 (ICD-10-CM)]  -     XR THUMB RT MIN 2 V; Future  4. Stiffness of right hand joint  5. Bilateral hand pain          Return in about 2 months (around 5/9/2023). An electronic signature was used to authenticate this note.   -- Drea Storey MD

## 2023-03-09 NOTE — LETTER
3/9/2023    Patient: Isela Bill   YOB: 1945   Date of Visit: 3/9/2023     Ronak Antonio, [address] [facility] [address]  Via Fax: [phone]    Dear Ronak Antonio MD,      Thank you for referring Ms. Catie Sanchez to [facility] for evaluation. My notes for this consultation are attached. If you have questions, please do not hesitate to call me. I look forward to following your patient along with you.       Sincerely,    Callum Lockwood MD

## 2023-03-09 NOTE — PROGRESS NOTES
PT DAILY Progress Note    Patient Name: Tk Brunson  3/9/2023  : 1945  [x]  Patient  Verified  Payor: Patrickla Collar / Plan: [place] MEDICARE PART A & B / Product Type: Medicare /    Total Treatment Time (min): 60   Total Timed Codes (min): 30    Referring Physician:   Kathryn Cavazos MD         1. Localized primary osteoarthritis of carpometacarpal (CMC) joint of right wrist  2. Right wrist pain  3. Stiffness of right hand joint      SUBJECTIVE  Subjective functional status/changes:   [x] No changes reported  Patient saw Dr. Kofi Prince today. They are both pleased with her current recovery    OBJECTIVE/TREATMENT   Manual therapy: 20 minutes  Myofascial release to the incisional   Mobilization to the [facility] joint passive stretching    Therapeutic exercise 20 minutes 10 min 1-1  1. Therapy and scar release technique instruction  2. Flex bar  3. Wrist flexion stretch table edge  4. Prayer stretch  5.  Web hand exerciser   6. Passive range of motion thumb and wrist  7. Pronation supination small hammer    Added/Changed Exercises:  [x]  Advanced to address: [x] functional strength/ROM deficits [] balance/proprioceptive tasks  []  Modified: [] per subjective reports [] for patient time constraints [] for clinic time constraints    Modality:  [x]  Ice pack: post    10  [x]  Hot pack: pre10  []  Other:         Patient Education: [x] Review HEP    [] Progressed/Changed HEP based on:  [] positioning   [] body mechanics   [] transfers   [] heat/ice application        ASSESSMENT  []  See Plan of Care  []  See progress note/recertification  [x]  Patient will continue to benefit from skilled therapy to address remaining functional deficits:     Patient continues to have pain at the [facility] joint soft tissue restriction and functional mobility loss. Her  strength is improving remains with a deficit of 25%.   Continue PT biweekly    PLAN  [x]  Upgrade activities as tolerated      [x]  Continue plan of care  []  Discharge due to:_  [] Other:_      Return in about 5 days (around 3/14/2023) for [id number] [place] [address] physical therapy.      Lorayne Kayser, PT 3/9/2023

## 2023-03-14 ENCOUNTER — OFFICE VISIT (OUTPATIENT)
Dept: ORTHOPEDIC SURGERY | Age: 78
End: 2023-03-14
Payer: MEDICARE

## 2023-03-14 DIAGNOSIS — M18.0 PRIMARY OSTEOARTHRITIS OF BOTH FIRST CARPOMETACARPAL JOINTS: ICD-10-CM

## 2023-03-14 DIAGNOSIS — M25.641 STIFFNESS OF RIGHT HAND JOINT: ICD-10-CM

## 2023-03-14 DIAGNOSIS — M19.031 LOCALIZED PRIMARY OSTEOARTHRITIS OF CARPOMETACARPAL (CMC) JOINT OF RIGHT WRIST: Primary | ICD-10-CM

## 2023-03-14 DIAGNOSIS — M25.531 RIGHT WRIST PAIN: ICD-10-CM

## 2023-03-14 PROCEDURE — 97140 MANUAL THERAPY 1/> REGIONS: CPT | Performed by: PHYSICAL THERAPIST

## 2023-03-14 PROCEDURE — 97110 THERAPEUTIC EXERCISES: CPT | Performed by: PHYSICAL THERAPIST

## 2023-03-14 NOTE — PROGRESS NOTES
PT DAILY Progress Note    Patient Name: Arlen Moseley  3/14/2023  : 1945  [x]  Patient  Verified  Payor: Daljit Schaeffer / Plan: [place] MEDICARE PART A & B / Product Type: Medicare /    Total Treatment Time (min): 60   Total Timed Codes (min): 30    Referring Physician:   Edilma Rodríguez MD         1. Localized primary osteoarthritis of carpometacarpal (CMC) joint of right wrist  2. Right wrist pain  3. Stiffness of right hand joint  4. Primary osteoarthritis of both first carpometacarpal joints [M18.0 (ICD-10-CM)]      SUBJECTIVE  Subjective functional status/changes:   [] No changes reported    C/O continued stiffness. OBJECTIVE/TREATMENT    Manual therapy: 25 minutes  Myofascial release to the incisional   Mobilization to the [facility] joint passive stretching    Therapeutic exercise 28 minutes 10 min 1-1  1. Therapy and scar release technique instruction  2. Flex bar  3. Wrist flexion stretch table edge  4. Prayer stretch  5.  Web hand exerciser   6. Passive range of motion thumb and wrist  7. Pronation supination small hammer  8. UBE 6 min    Added/Changed Exercises:  [x]  Advanced to address: [x] functional strength/ROM deficits [] balance/proprioceptive tasks  []  Modified: [] per subjective reports [] for patient time constraints [] for clinic time constraints    Modality:  [x]  Ice pack: post    10  [x]  Hot pack: pre10  []  Other:         Patient Education: [x] Review HEP    [] Progressed/Changed HEP based on:  [] positioning   [] body mechanics   [] transfers   [] heat/ice application        ASSESSMENT  []  See Plan of Care  []  See progress note/recertification  [x]  Patient will continue to benefit from skilled therapy to address remaining functional deficits:     Functional motion and strength loss remain at the right wrist and hand.   We will continue to progress functional activities in the clinic with supervision as well as manual based care   continue PT biweekly    PLAN  [x]  Upgrade activities as tolerated      [x]  Continue plan of care  []  Discharge due to:_  [] Other:_      Return in about 2 days (around 3/16/2023) for [id number] [place] [address] physical therapy.      Daniel Left, PT 3/14/2023

## 2023-03-16 ENCOUNTER — OFFICE VISIT (OUTPATIENT)
Dept: ORTHOPEDIC SURGERY | Age: 78
End: 2023-03-16

## 2023-03-16 DIAGNOSIS — M25.641 STIFFNESS OF RIGHT HAND JOINT: ICD-10-CM

## 2023-03-16 DIAGNOSIS — M25.531 RIGHT WRIST PAIN: ICD-10-CM

## 2023-03-16 DIAGNOSIS — M19.031 LOCALIZED PRIMARY OSTEOARTHRITIS OF CARPOMETACARPAL (CMC) JOINT OF RIGHT WRIST: Primary | ICD-10-CM

## 2023-03-16 NOTE — PROGRESS NOTES
PT DAILY Progress Note    Patient Name: Arlen Moseley  3/16/2023  : 1945  [x]  Patient  Verified  Payor: Daljit Schaeffer / Plan: [place] MEDICARE PART A & B / Product Type: Medicare /    Total Treatment Time (min): 60   Total Timed Codes (min): 40    Referring Physician:   Edilma Rodríguez MD         1. Localized primary osteoarthritis of carpometacarpal (CMC) joint of right wrist  2. Right wrist pain  3. Stiffness of right hand joint      SUBJECTIVE  Subjective functional status/changes:   [] No changes reported    Complains of morning stiffness. Difficulty with gripping and squeezing related tasks    OBJECTIVE/TREATMENT  All activities were performed individually on separate with one-to-one time as indicated  Manual therapy: 25 minutes one-to-one  Myofascial release to the incisional   Mobilization to the [facility] joint passive stretching    Therapeutic exercise 30 minutes 15 min 1-1  1. Therapy and scar release technique instruction  2. Flex bar  3. Wrist flexion stretch table edge  4. Prayer stretch  5.  Web hand exerciser    6. Passive range of motion thumb and wrist  7. Pronation supination small hammer  8. UBE 8 min    Added/Changed Exercises:  [x]  Advanced to address: [x] functional strength/ROM deficits [] balance/proprioceptive tasks  []  Modified: [] per subjective reports [] for patient time constraints [] for clinic time constraints    Modality:  [x]  Ice pack: post    10  [x]  Hot pack: pre10  []  Other:         Patient Education: [x] Review HEP    [] Progressed/Changed HEP based on:  [] positioning   [] body mechanics   [] transfers   [] heat/ice application        ASSESSMENT  []  See Plan of Care  []  See progress note/recertification  [x]  Patient will continue to benefit from skilled therapy to address remaining functional deficits:     Patient continues to make progress however the mobility of the [facility] joint MCP joint and IP joint remains restricted and stiff.   We are progressing with functional activities to restore strength and motor activities verbal cues and manual therapy continue to be required. We will follow her again early next week. PLAN  [x]  Upgrade activities as tolerated      [x]  Continue plan of care  []  Discharge due to:_  [] Other:_      No follow-ups on file.      Norbert Andres, PT 3/16/2023

## 2023-03-21 ENCOUNTER — OFFICE VISIT (OUTPATIENT)
Dept: ORTHOPEDIC SURGERY | Age: 78
End: 2023-03-21

## 2023-03-21 DIAGNOSIS — M19.031 LOCALIZED PRIMARY OSTEOARTHRITIS OF CARPOMETACARPAL (CMC) JOINT OF RIGHT WRIST: Primary | ICD-10-CM

## 2023-03-21 DIAGNOSIS — M25.531 RIGHT WRIST PAIN: ICD-10-CM

## 2023-03-21 DIAGNOSIS — M25.641 STIFFNESS OF RIGHT HAND JOINT: ICD-10-CM

## 2023-03-21 NOTE — PROGRESS NOTES
PT DAILY Progress Note    Patient Name: Herson Redman  3/21/2023  : 1945  [x]  Patient  Verified  Payor: Didier Gudino / Plan: [place] MEDICARE PART A & B / Product Type: Medicare /    Total Treatment Time (min): 60   Total Timed Codes (min): 30    Referring Physician:   Ina Murray MD         1. Localized primary osteoarthritis of carpometacarpal (CMC) joint of right wrist  2. Right wrist pain  3. Stiffness of right hand joint      SUBJECTIVE  Subjective functional status/changes:   [] No changes reported    Still reports weakness and difficulty with endrange motion at the [facility] joint. OBJECTIVE/TREATMENT  All activities were performed individually on separate with one-to-one time as indicated  Manual therapy: 20 minutes one-to-one  Myofascial release to the incisional   Mobilization to the [facility] joint passive stretching    Therapeutic exercise 30 minutes 10 min 1-1  1. Therapy and scar release technique instruction  2. Flex bar  3. Wrist flexion stretch table edge  4. Prayer stretch  5.  Web hand exerciser    6. Passive range of motion thumb and wrist  7. Pronation supination small hammer  8. UBE 8 min    Added/Changed Exercises:  [x]  Advanced to address: [x] functional strength/ROM deficits [] balance/proprioceptive tasks  []  Modified: [] per subjective reports [] for patient time constraints [] for clinic time constraints    Modality:  [x]  Ice pack: post    10  [x]  Hot pack: pre10  []  Other:         Patient Education: [x] Review HEP    [] Progressed/Changed HEP based on:  [] positioning   [] body mechanics   [] transfers   [] heat/ice application        ASSESSMENT  []  See Plan of Care  []  See progress note/recertification  [x]  Patient will continue to benefit from skilled therapy to address remaining functional deficits:     Patient is doing better she is able to approximate the thumb to the base of the [facility] joint of the small finger.   She does continue to have some difficulty maintaining this as well as some weakness at the index finger and CMC joint. We will continue to progress functional strengthening and mobility    PLAN  [x]  Upgrade activities as tolerated      [x]  Continue plan of care  []  Discharge due to:_  [] Other:_      No follow-ups on file.      Ariadne Snyder, PT 3/21/2023

## 2023-03-22 NOTE — PROGRESS NOTES
PT DAILY Progress Note    Patient Name: Atif Riggins  3/23/2023  : 1945  [x]  Patient  Verified  Payor: Jennifer Varma / Plan: [place] MEDICARE PART A & B / Product Type: Medicare /    Total Treatment Time (min): 75   Total Timed Codes (min): 40    Referring Physician:   Del Acosta MD         1. Localized primary osteoarthritis of carpometacarpal (CMC) joint of right wrist  2. Right wrist pain  3. Stiffness of right hand joint      SUBJECTIVE  Subjective functional status/changes:   [] No changes reported    Still reports weakness and difficulty with endrange motion at the [facility] joint. OBJECTIVE/TREATMENT  All activities were performed individually on separate with one-to-one time as indicated  Manual therapy: 25 minutes one-to-one  Myofascial release to the incisional   Mobilization to the [facility] joint passive stretching    Therapeutic exercise 30 minutes 15 min 1-1  1. Therapy and scar release technique instruction  2. Flex bar  3. Wrist flexion stretch table edge  4. Prayer stretch  5.  Web hand exerciser    6. Passive range of motion thumb and wrist  7. Pronation supination small hammer  8. UBE 8 min  9. Weighted ball   10.   Key  and lateral pinch with Thera-Band pull-apart    Added/Changed Exercises:  [x]  Advanced to address: [x] functional strength/ROM deficits [] balance/proprioceptive tasks  []  Modified: [] per subjective reports [] for patient time constraints [] for clinic time constraints    Modality:  [x]  Ice pack: post    10  [x]  Hot pack: pre10  []  Other:         Patient Education: [x] Review HEP    [] Progressed/Changed HEP based on:  [] positioning   [] body mechanics   [] transfers   [] heat/ice application        ASSESSMENT  []  See Plan of Care  []  See progress note/recertification  [x]  Patient will continue to benefit from skilled therapy to address remaining functional deficits:     Patient continues to have endrange motion loss in conjunction with weakness of patient continues with functional motion loss in conjunction with strength deficits with cylindrical and key  primarily but endrange wrist flexion strength also remains deficient. We will follow her again early next week    PLAN  [x]  Upgrade activities as tolerated      [x]  Continue plan of care  []  Discharge due to:_  [] Other:_      Return in about 5 days (around 3/28/2023) for [address] [place] [address] physical therapy.      Carie Kitchen, PT 3/23/2023

## 2023-03-23 ENCOUNTER — OFFICE VISIT (OUTPATIENT)
Dept: ORTHOPEDIC SURGERY | Age: 78
End: 2023-03-23

## 2023-03-23 DIAGNOSIS — M19.031 LOCALIZED PRIMARY OSTEOARTHRITIS OF CARPOMETACARPAL (CMC) JOINT OF RIGHT WRIST: Primary | ICD-10-CM

## 2023-03-23 DIAGNOSIS — M25.531 RIGHT WRIST PAIN: ICD-10-CM

## 2023-03-23 DIAGNOSIS — M25.641 STIFFNESS OF RIGHT HAND JOINT: ICD-10-CM

## 2023-03-30 ENCOUNTER — OFFICE VISIT (OUTPATIENT)
Dept: ORTHOPEDIC SURGERY | Age: 78
End: 2023-03-30
Payer: MEDICARE

## 2023-03-30 DIAGNOSIS — M25.531 RIGHT WRIST PAIN: ICD-10-CM

## 2023-03-30 DIAGNOSIS — M19.031 LOCALIZED PRIMARY OSTEOARTHRITIS OF CARPOMETACARPAL (CMC) JOINT OF RIGHT WRIST: Primary | ICD-10-CM

## 2023-03-30 DIAGNOSIS — M25.641 STIFFNESS OF RIGHT HAND JOINT: ICD-10-CM

## 2023-03-30 DIAGNOSIS — M18.0 PRIMARY OSTEOARTHRITIS OF BOTH FIRST CARPOMETACARPAL JOINTS: ICD-10-CM

## 2023-03-30 PROCEDURE — 97110 THERAPEUTIC EXERCISES: CPT | Performed by: PHYSICAL THERAPIST

## 2023-03-30 PROCEDURE — 97140 MANUAL THERAPY 1/> REGIONS: CPT | Performed by: PHYSICAL THERAPIST

## 2023-03-30 NOTE — PROGRESS NOTES
PT DAILY Progress Note    Patient Name: Cherelle Lane  3/30/2023  : 1945  [x]  Patient  Verified  Payor: Brittany Bio / Plan: [place] / Product Type: Medicare /    Total Treatment Time (min): 75   Total Timed Codes (min): 30    Referring Physician:   Marta Knight MD     1. Localized primary osteoarthritis of carpometacarpal (CMC) joint of right wrist  2. Right wrist pain  3. Stiffness of right hand joint  4. Primary osteoarthritis of both first carpometacarpal joints [M18.0 (ICD-10-CM)]      SUBJECTIVE  Subjective functional status/changes:   [] No changes reported    C/o of some shoulder pain anterior    OBJECTIVE/TREATMENT  All activities were performed individually on separate with one-to-one time as indicated. Manual therapy: 20 minutes one-to-one  Myofascial release to the incisional   Mobilization to the [facility] joint passive stretching    Therapeutic exercise 30 minutes 10 min 1-1  1. Therapy and scar release technique instruction  2. Flex bar  3. Wrist flexion stretch table edge  4. Prayer stretch  5.  Web hand exerciser    6. Passive range of motion thumb and wrist  7. Pronation supination small hammer  8. Clinger no weight  9. Door pulleys      Added/Changed Exercises:  [x]  Advanced to address: [x] functional strength/ROM deficits [] balance/proprioceptive tasks  []  Modified: [] per subjective reports [] for patient time constraints [] for clinic time constraints    Modality:  [x]  Ice pack: post    10  [x]  Hot pack: pre10  []  Other:         Patient Education: [x] Review HEP    [] Progressed/Changed HEP based on:  [] positioning   [] body mechanics   [] transfers   [] heat/ice application        ASSESSMENT  []  See Plan of Care  []  See progress note/recertification  [x]  Patient will continue to benefit from skilled therapy to address remaining functional deficits:     Patient is having a little bit of anterior right shoulder pain.   Feel it might be related to the lack of functional use at and above shoulder height secondary to her thumb recovery. We did hold the UB E today as that was added last visit worked with more active assisted range of motion activities for the shoulder utilizing over-the-door pulley as well as the Clinger stick. Overall the patient is doing quite well she has just a small loss of CMC mobility and strength we will follow her once a week over the next 3 weeks to continue to advance functional strength at home activities as well as assist with restoration of full range of motion. PLAN  [x]  Upgrade activities as tolerated      [x]  Continue plan of care  []  Discharge due to:_  [] Other:_      Return in about 10 days (around 4/9/2023) for [address] [place] [address] physical therapy.      Jeromy Fisher, PT 3/30/2023

## 2023-04-18 ENCOUNTER — OFFICE VISIT (OUTPATIENT)
Dept: ORTHOPEDIC SURGERY | Age: 78
End: 2023-04-18

## 2023-04-18 DIAGNOSIS — M19.031 LOCALIZED PRIMARY OSTEOARTHRITIS OF CARPOMETACARPAL (CMC) JOINT OF RIGHT WRIST: Primary | ICD-10-CM

## 2023-04-18 DIAGNOSIS — M25.641 STIFFNESS OF RIGHT HAND JOINT: ICD-10-CM

## 2023-04-18 DIAGNOSIS — M25.531 RIGHT WRIST PAIN: ICD-10-CM

## 2023-04-18 NOTE — PROGRESS NOTES
PT DAILY Progress Note    Patient Name: Deb Rubio  2023  : 1945  [x]  Patient  Verified  Payor: Timothy Isia / Plan: [place] / Product Type: Medicare /    Total Treatment Time (min): 50  Total Timed Codes (min): 30    Referring Physician:   Wojciech Samuel MD     1. Localized primary osteoarthritis of carpometacarpal (CMC) joint of right wrist  2. Right wrist pain  3. Stiffness of right hand joint          SUBJECTIVE  Subjective functional status/changes:   [] No changes reported    Continues with complaints of pain at end range of motion. OBJECTIVE/TREATMENT  Patient continues to carry some volume in the involved index finger of the surgical hand. She does continue with end range of motion and strength deficits most notably with lateral pinch and key     All activities were performed individually on separate with one-to-one time as indicated. Coban taping for swelling control was applied distal to proximal at the index finger    Manual therapy: 20 minutes one-to-one  Myofascial release to the incisional   Mobilization to the [facility] joint passive stretching    Therapeutic exercise 30 minutes 10 min 1-1  1. Therapy and scar release technique instruction  2. Flex bar  3. Wrist flexion stretch table edge  4. Prayer stretch  5.  Web hand exerciser    6. Passive range of motion thumb and wrist  7. Pronation supination small hammer  8.   Clinger no weight        Added/Changed Exercises:  [x]  Advanced to address: [x] functional strength/ROM deficits [] balance/proprioceptive tasks  []  Modified: [] per subjective reports [] for patient time constraints [] for clinic time constraints    Modality:  [x]  Ice pack: post    10  [x]  Hot pack: pre10  []  Other:         Patient Education: [x] Review HEP    [] Progressed/Changed HEP based on:  [] positioning   [] body mechanics   [] transfers   [] heat/ice application        ASSESSMENT  []  See Plan of Care  []  See progress note/recertification  [x]  Patient will continue to benefit from skilled therapy to address remaining functional deficits:     She does continue to carry some swelling most notably in the index finger of the involved right hand. We tried some Coban wrapping for swelling control today see how this improves her pad to pad pinch and lateral key  endrange motion at the [facility] joint remains we continue to progress    Continues with loss of CMC mobility and strength we will follow her once a week over the next 3 weeks to continue to advance functional strength at home activities as well as assist with restoration of full range of motion. PLAN  [x]  Upgrade activities as tolerated      [x]  Continue plan of care  []  Discharge due to:_  [] Other:_      Return in about 2 days (around 4/20/2023) for [address] [place] [address] physical therapy.      Anh Connell, PT 4/19/2023

## 2023-04-20 ENCOUNTER — OFFICE VISIT (OUTPATIENT)
Dept: ORTHOPEDIC SURGERY | Age: 78
End: 2023-04-20

## 2023-04-20 DIAGNOSIS — M25.531 RIGHT WRIST PAIN: ICD-10-CM

## 2023-04-20 DIAGNOSIS — M25.641 STIFFNESS OF RIGHT HAND JOINT: ICD-10-CM

## 2023-04-20 DIAGNOSIS — M19.031 LOCALIZED PRIMARY OSTEOARTHRITIS OF CARPOMETACARPAL (CMC) JOINT OF RIGHT WRIST: Primary | ICD-10-CM

## 2023-04-20 NOTE — PROGRESS NOTES
PT DAILY Progress Note    Patient Name: Jack Recinos  2023  : 1945  [x]  Patient  Verified  Payor: Caroline Soares / Plan: [place] MEDICARE PART A & B / Product Type: Medicare /    Total Treatment Time (min): 50  Total Timed Codes (min): 30    Referring Physician:   Bao Mora MD     1. Localized primary osteoarthritis of carpometacarpal (CMC) joint of right wrist  2. Right wrist pain  3. Stiffness of right hand joint    SUBJECTIVE  Subjective functional status/changes:   [x] No changes reported      OBJECTIVE/TREATMENT  Patient continues to carry some volume in the involved index finger of the surgical hand. She does continue with end range of motion and strength deficits most notably with lateral pinch and key     All activities were performed individually on separate with one-to-one time as indicated. Coban taping for swelling control was applied distal to proximal at the index finger    Manual therapy: 20 minutes one-to-one  Myofascial release to the incisional   Mobilization to the [facility] joint passive stretching  Coban digital taping for swelling to the index finger    Therapeutic exercise 30 minutes 10 min 1-1  1. Therapy and scar release technique instruction  2. Flex bar  3. Wrist flexion stretch table edge  4. Prayer stretch  5.  Web hand exerciser    6. Passive range of motion thumb and wrist  7. Pronation supination small hammer  8.   Low layered wrap for maintained basal joint flexion        Added/Changed Exercises:  [x]  Advanced to address: [x] functional strength/ROM deficits [] balance/proprioceptive tasks  []  Modified: [] per subjective reports [] for patient time constraints [] for clinic time constraints    Modality:  [x]  Ice pack: post    10  [x]  Hot pack: pre10  []  Other:         Patient Education: [x] Review HEP    [] Progressed/Changed HEP based on:  [] positioning   [] body mechanics   [] transfers   [] heat/ice application        ASSESSMENT  []  See Plan of Care  [] See progress note/recertification  [x]  Patient will continue to benefit from skilled therapy to address remaining functional deficits:     Coban wrapping was helpful with reduction in the digital swelling. She continues with end range of motion and strength loss. Continues to require skilled PT to return to premorbid functional level to allow for full return to activities including gardening as well as working with buttoning clothing and utilizing key and lateral . PLAN  [x]  Upgrade activities as tolerated      [x]  Continue plan of care  []  Discharge due to:_  [] Other:_      Return in about 5 days (around 4/25/2023) for [address] [place] [address] physical therapy.      Elizabeth Zvaala, PT 4/22/2023

## 2023-04-26 ENCOUNTER — OFFICE VISIT (OUTPATIENT)
Dept: ORTHOPEDIC SURGERY | Age: 78
End: 2023-04-26
Payer: MEDICARE

## 2023-04-26 DIAGNOSIS — M25.531 RIGHT WRIST PAIN: ICD-10-CM

## 2023-04-26 DIAGNOSIS — M18.0 PRIMARY OSTEOARTHRITIS OF BOTH FIRST CARPOMETACARPAL JOINTS: ICD-10-CM

## 2023-04-26 DIAGNOSIS — M19.031 LOCALIZED PRIMARY OSTEOARTHRITIS OF CARPOMETACARPAL (CMC) JOINT OF RIGHT WRIST: Primary | ICD-10-CM

## 2023-04-26 DIAGNOSIS — M25.641 STIFFNESS OF RIGHT HAND JOINT: ICD-10-CM

## 2023-04-26 PROCEDURE — 97140 MANUAL THERAPY 1/> REGIONS: CPT | Performed by: PHYSICAL THERAPIST

## 2023-04-26 PROCEDURE — 97110 THERAPEUTIC EXERCISES: CPT | Performed by: PHYSICAL THERAPIST

## 2023-04-26 NOTE — PROGRESS NOTES
PT DAILY Progress Note    Patient Name: Luis Blanca  2023  : 1945  [x]  Patient  Verified  Payor: Anatoly De La Cruz / Plan: [place] MEDICARE PART A & B / Product Type: Medicare /    Total Treatment Time (min): 50  Total Timed Codes (min): 28    Referring Physician:   Zhao Light MD     1. Localized primary osteoarthritis of carpometacarpal (CMC) joint of right wrist  2. Right wrist pain  3. Stiffness of right hand joint  4. Primary osteoarthritis of both first carpometacarpal joints [M18.0 (ICD-10-CM)]      SUBJECTIVE  Subjective functional status/changes:   [x] No changes reported  Patient reports that she is increasing functional use of the hand with gardening she does report she continues to have pain and inability to return full activity level    OBJECTIVE/TREATMENT  Pointer digit fluid looks better with the previous taping    All activities were performed individually on separate with one-to-one time as indicated. Coban taping for swelling control was applied distal to proximal at the index finger    Manual therapy: 20 minutes one-to-one  Myofascial release to the incisional   Mobilization to the [facility] joint passive stretching      Therapeutic exercise 30 minutes 8 min 1-1  1. Therapy and scar release technique instruction  2. Flex bar  3. Wrist flexion stretch table edge  4. Prayer stretch  5.  Web hand exerciser    6. Passive range of motion thumb and wrist  7. Pronation supination small hammer  8.   Low layered wrap for maintained basal joint flexion        Added/Changed Exercises:  [x]  Advanced to address: [x] functional strength/ROM deficits [] balance/proprioceptive tasks  []  Modified: [] per subjective reports [] for patient time constraints [] for clinic time constraints    Modality:  [x]  Ice pack: post    10  [x]  Hot pack: pre10  []  Other:         Patient Education: [x] Review HEP    [] Progressed/Changed HEP based on:  [] positioning   [] body mechanics   [] transfers   [] heat/ice application        ASSESSMENT  []  See Plan of Care  []  See progress note/recertification  [x]  Patient will continue to benefit from skilled therapy to address remaining functional deficits: The Coban digital wrapping has helped reduce the digital swelling. She is progressing with functional activities does continue to have loss of functional activity strength in regard to her premorbid level. We will follow her again this week and then reduce her to once a week for 2 weeks with hopes of discharging her in the next 3 weeks. PLAN  [x]  Upgrade activities as tolerated      [x]  Continue plan of care  []  Discharge due to:_  [] Other:_      Return in about 2 days (around 4/28/2023) for [address] [place] [address] physical therapy.      Sammy Burnette, PT 4/26/2023

## 2023-04-28 ENCOUNTER — OFFICE VISIT (OUTPATIENT)
Dept: ORTHOPEDIC SURGERY | Age: 78
End: 2023-04-28
Payer: MEDICARE

## 2023-04-28 DIAGNOSIS — M25.641 STIFFNESS OF RIGHT HAND JOINT: ICD-10-CM

## 2023-04-28 DIAGNOSIS — M18.0 PRIMARY OSTEOARTHRITIS OF BOTH FIRST CARPOMETACARPAL JOINTS: ICD-10-CM

## 2023-04-28 DIAGNOSIS — M25.531 RIGHT WRIST PAIN: ICD-10-CM

## 2023-04-28 DIAGNOSIS — M19.031 LOCALIZED PRIMARY OSTEOARTHRITIS OF CARPOMETACARPAL (CMC) JOINT OF RIGHT WRIST: Primary | ICD-10-CM

## 2023-04-28 PROCEDURE — 97110 THERAPEUTIC EXERCISES: CPT | Performed by: PHYSICAL THERAPIST

## 2023-04-28 PROCEDURE — 97140 MANUAL THERAPY 1/> REGIONS: CPT | Performed by: PHYSICAL THERAPIST

## 2023-04-28 NOTE — PROGRESS NOTES
PT DAILY Progress Note    Patient Name: Ghazal Ryan  2023  : 1945  [x]  Patient  Verified  Payor: Elli Moody / Plan: [place] MEDICARE PART A & B / Product Type: Medicare /    Total Treatment Time (min): 50  Total Timed Codes (min): 30    Referring Physician:   Syed Adame MD     1. Localized primary osteoarthritis of carpometacarpal (CMC) joint of right wrist  2. Right wrist pain  3. Stiffness of right hand joint  4. Primary osteoarthritis of both first carpometacarpal joints [M18.0 (ICD-10-CM)]        SUBJECTIVE  Subjective functional status/changes:   [x] No changes reported  No new or changed complaints    OBJECTIVE/TREATMENT  10 % loss of basal joint ROM    Manual therapy: 20 minutes one-to-one  Myofascial release to the incisional   Mobilization to the [facility] joint passive stretching      Therapeutic exercise 30 [id number] min 1-1  1. Therapy and scar release technique instruction  2. Flex bar  3. Wrist flexion stretch table edge  4. Prayer stretch  5.  Web hand exerciser    6. Passive range of motion thumb and wrist  7. Pronation supination small hammer  8. Low layered wrap for maintained basal joint flexion        Added/Changed Exercises:  [x]  Advanced to address: [x] functional strength/ROM deficits [] balance/proprioceptive tasks  []  Modified: [] per subjective reports [] for patient time constraints [] for clinic time constraints    Modality:  [x]  Ice pack: post    10  [x]  Hot pack: pre10  []  Other:         Patient Education: [x] Review HEP    [] Progressed/Changed HEP based on:  [] positioning   [] body mechanics   [] transfers   [] heat/ice application        ASSESSMENT  []  See Plan of Care  []  See progress note/recertification  [x]  Patient will continue to benefit from skilled therapy to address remaining functional deficits:   Patient continues to steady improvement.   She continues to have endrange strength loss we will continue to progress functional strength activities in the clinic. We will reduce her to 1 time per week    PLAN  [x]  Upgrade activities as tolerated      [x]  Continue plan of care  []  Discharge due to:_  [x] Other:_Reduce to 1 time per week    Return in about 1 week (around 5/5/2023) for [id number] [place] [address] physical therapy.      Nicholas Salamanca, PT 4/28/2023

## 2023-05-03 ENCOUNTER — OFFICE VISIT (OUTPATIENT)
Dept: ORTHOPEDIC SURGERY | Age: 78
End: 2023-05-03

## 2023-05-03 DIAGNOSIS — M25.531 RIGHT WRIST PAIN: ICD-10-CM

## 2023-05-03 DIAGNOSIS — M25.641 STIFFNESS OF RIGHT HAND JOINT: ICD-10-CM

## 2023-05-03 DIAGNOSIS — M19.031 LOCALIZED PRIMARY OSTEOARTHRITIS OF CARPOMETACARPAL (CMC) JOINT OF RIGHT WRIST: Primary | ICD-10-CM

## 2023-05-03 DIAGNOSIS — M79.642 BILATERAL HAND PAIN: ICD-10-CM

## 2023-05-03 DIAGNOSIS — M18.0 PRIMARY OSTEOARTHRITIS OF BOTH FIRST CARPOMETACARPAL JOINTS: ICD-10-CM

## 2023-05-03 DIAGNOSIS — M79.641 BILATERAL HAND PAIN: ICD-10-CM

## 2024-12-04 ENCOUNTER — HOSPITAL ENCOUNTER (OUTPATIENT)
Age: 79
Discharge: HOME | End: 2024-12-07
Payer: MEDICARE

## 2024-12-04 DIAGNOSIS — R74.8 ACID PHOSPHATASE ELEVATED: ICD-10-CM

## 2024-12-04 PROCEDURE — 76705 ECHO EXAM OF ABDOMEN: CPT

## 2025-06-09 PROBLEM — M81.0 OSTEOPOROSIS: Status: ACTIVE | Noted: 2025-06-09

## 2025-06-09 RX ORDER — SODIUM CHLORIDE 0.9 % (FLUSH) 0.9 %
5-40 SYRINGE (ML) INJECTION PRN
OUTPATIENT
Start: 2025-06-16

## 2025-06-09 RX ORDER — SODIUM CHLORIDE 9 MG/ML
5-250 INJECTION, SOLUTION INTRAVENOUS PRN
OUTPATIENT
Start: 2025-06-16

## 2025-06-09 RX ORDER — HEPARIN 100 UNIT/ML
500 SYRINGE INTRAVENOUS PRN
OUTPATIENT
Start: 2025-06-16

## 2025-06-16 ENCOUNTER — HOSPITAL ENCOUNTER (OUTPATIENT)
Facility: HOSPITAL | Age: 80
Setting detail: INFUSION SERIES
Discharge: HOME | End: 2025-06-16
Payer: MEDICARE

## 2025-06-16 VITALS
WEIGHT: 140 LBS | DIASTOLIC BLOOD PRESSURE: 80 MMHG | BODY MASS INDEX: 23.3 KG/M2 | SYSTOLIC BLOOD PRESSURE: 134 MMHG | HEART RATE: 71 BPM | RESPIRATION RATE: 16 BRPM | OXYGEN SATURATION: 99 % | TEMPERATURE: 97.7 F

## 2025-06-16 DIAGNOSIS — M81.0 OSTEOPOROSIS, UNSPECIFIED OSTEOPOROSIS TYPE, UNSPECIFIED PATHOLOGICAL FRACTURE PRESENCE: Primary | ICD-10-CM

## 2025-06-16 LAB
ANION GAP BLD CALC-SCNC: 11.1 MMOL/L (ref 10–20)
CA-I BLD-MCNC: 1.23 MMOL/L (ref 1.15–1.33)
CHLORIDE BLD-SCNC: 104 MMOL/L (ref 98–107)
CO2 BLD-SCNC: 27.9 MMOL/L (ref 21–32)
CREAT BLD-MCNC: 0.65 MG/DL (ref 0.6–1.3)
GLUCOSE BLD-MCNC: 140 MG/DL (ref 74–99)
POTASSIUM BLD-SCNC: 3.7 MMOL/L (ref 3.5–5.1)
SERVICE CMNT-IMP: ABNORMAL
SODIUM BLD-SCNC: 143 MMOL/L (ref 136–145)

## 2025-06-16 PROCEDURE — 6360000002 HC RX W HCPCS: Performed by: INTERNAL MEDICINE

## 2025-06-16 PROCEDURE — 80047 BASIC METABLC PNL IONIZED CA: CPT

## 2025-06-16 PROCEDURE — 96374 THER/PROPH/DIAG INJ IV PUSH: CPT

## 2025-06-16 RX ORDER — SODIUM CHLORIDE 0.9 % (FLUSH) 0.9 %
5-40 SYRINGE (ML) INJECTION PRN
OUTPATIENT
Start: 2026-06-14

## 2025-06-16 RX ORDER — SODIUM CHLORIDE 9 MG/ML
5-250 INJECTION, SOLUTION INTRAVENOUS PRN
OUTPATIENT
Start: 2026-06-14

## 2025-06-16 RX ORDER — SODIUM CHLORIDE 9 MG/ML
5-250 INJECTION, SOLUTION INTRAVENOUS PRN
Status: DISCONTINUED | OUTPATIENT
Start: 2025-06-16 | End: 2025-06-17 | Stop reason: HOSPADM

## 2025-06-16 RX ORDER — ZOLEDRONIC ACID 0.05 MG/ML
5 INJECTION, SOLUTION INTRAVENOUS ONCE
OUTPATIENT
Start: 2026-06-14 | End: 2026-06-14

## 2025-06-16 RX ORDER — HEPARIN 100 UNIT/ML
500 SYRINGE INTRAVENOUS PRN
OUTPATIENT
Start: 2026-06-14

## 2025-06-16 RX ORDER — ZOLEDRONIC ACID 0.05 MG/ML
5 INJECTION, SOLUTION INTRAVENOUS ONCE
Status: COMPLETED | OUTPATIENT
Start: 2025-06-16 | End: 2025-06-16

## 2025-06-16 RX ADMIN — ZOLEDRONIC ACID 5 MG: 5 INJECTION INTRAVENOUS at 14:41

## 2025-06-16 NOTE — PROGRESS NOTES
[facility] Short Note                       Date: 2025    Name: Caity Villasenor    MRN: [id number]         : 1945    Pt admit to [facility] for Reclast ambulatory in stable condition. Assessment completed. No new concerns voiced.        Ms. Villasenor's vitals were reviewed prior to treatment.   Patient Vitals for the past 12 hrs:   Temp Pulse Resp BP SpO2   25 1400 97.7 °F (36.5 °C) 71 16 134/80 99 %       Peripheral IV with positive blood return.       Medications Administered         zoledronic acid (RECLAST) 5 mg/100 mL infusion Admin Date  2025 Action  New Bag Dose  5 mg Rate  400 mL/hr Route  IntraVENous Documented By  Angela López, RN               Pt tolerated treatment well. D/c home ambulatory in no distress. Pt aware of next appointment scheduled for .    No future appointments.    Angela López RN  2025  3:25 PM